# Patient Record
Sex: MALE | Race: BLACK OR AFRICAN AMERICAN | Employment: UNEMPLOYED | URBAN - METROPOLITAN AREA
[De-identification: names, ages, dates, MRNs, and addresses within clinical notes are randomized per-mention and may not be internally consistent; named-entity substitution may affect disease eponyms.]

---

## 2017-01-05 ENCOUNTER — HOSPITAL ENCOUNTER (OUTPATIENT)
Dept: RESPIRATORY THERAPY | Age: 37
Discharge: HOME | End: 2017-01-05
Attending: FAMILY MEDICINE
Payer: MEDICARE

## 2017-01-05 DIAGNOSIS — J45.30 MILD PERSISTENT ASTHMA WITHOUT COMPLICATION: ICD-10-CM

## 2017-01-05 PROCEDURE — 94729 DIFFUSING CAPACITY: CPT

## 2017-01-05 PROCEDURE — 94726 PLETHYSMOGRAPHY LUNG VOLUMES: CPT

## 2017-01-05 PROCEDURE — 94010 BREATHING CAPACITY TEST: CPT

## 2017-01-11 ENCOUNTER — TELEPHONE (OUTPATIENT)
Dept: FAMILY MEDICINE CLINIC | Age: 37
End: 2017-01-11

## 2017-01-25 ENCOUNTER — HOSPITAL ENCOUNTER (OUTPATIENT)
Dept: LAB | Age: 37
Discharge: HOME | End: 2017-01-25
Payer: MEDICARE

## 2017-01-25 ENCOUNTER — TELEPHONE (OUTPATIENT)
Dept: FAMILY MEDICINE CLINIC | Age: 37
End: 2017-01-25

## 2017-01-25 ENCOUNTER — OFFICE VISIT (OUTPATIENT)
Dept: FAMILY MEDICINE CLINIC | Age: 37
End: 2017-01-25

## 2017-01-25 VITALS
HEART RATE: 61 BPM | TEMPERATURE: 98 F | SYSTOLIC BLOOD PRESSURE: 96 MMHG | DIASTOLIC BLOOD PRESSURE: 62 MMHG | HEIGHT: 69 IN | WEIGHT: 288 LBS | BODY MASS INDEX: 42.65 KG/M2 | RESPIRATION RATE: 20 BRPM | OXYGEN SATURATION: 99 %

## 2017-01-25 DIAGNOSIS — J03.91 RECURRENT TONSILLITIS: ICD-10-CM

## 2017-01-25 DIAGNOSIS — Z23 ENCOUNTER FOR IMMUNIZATION: Primary | ICD-10-CM

## 2017-01-25 PROCEDURE — 87070 CULTURE OTHR SPECIMN AEROBIC: CPT | Performed by: NURSE PRACTITIONER

## 2017-01-25 PROCEDURE — 87077 CULTURE AEROBIC IDENTIFY: CPT | Performed by: NURSE PRACTITIONER

## 2017-01-25 RX ORDER — ACETAMINOPHEN AND CODEINE PHOSPHATE 300; 30 MG/1; MG/1
1 TABLET ORAL
Qty: 30 TAB | Refills: 0 | Status: SHIPPED | OUTPATIENT
Start: 2017-01-25 | End: 2017-04-24

## 2017-01-25 NOTE — MR AVS SNAPSHOT
Visit Information Date & Time Provider Department Dept. Phone Encounter #  
 1/25/2017 11:45 AM Shagufta Ugalde NP [employer] [phone] [id number] Upcoming Health Maintenance Date Due Pneumococcal 19-64 Medium Risk (1 of 1 - PPSV23) 10/7/2000 INFLUENZA AGE 9 TO ADULT 8/1/2016 DTaP/Tdap/Td series (2 - Td) 10/28/2026 Allergies as of 1/25/2017  Review Complete On: 1/25/2017 By: Shagufta Ugalde NP No Known Allergies Current Immunizations  Reviewed on 3/20/2012 Name Date Influenza Vaccine (Quad)  Incomplete PPD 3/20/2012 Tdap 10/28/2016  2:37 AM  
  
 Not reviewed this visit You Were Diagnosed With   
  
 Codes Comments Encounter for immunization    -  Primary ICD-10-CM: M62 ICD-9-CM: V03.89 Recurrent tonsillitis     ICD-10-CM: J03.91 
ICD-9-CM: 838 Vitals BP Pulse Temp Resp Height(growth percentile) Weight(growth percentile) 96/62 61 98 °F (36.7 °C) (Oral) 20 5' 9\" (1.753 m) 288 lb (130.6 kg) SpO2 BMI Smoking Status 99% 42.53 kg/m2 Never Smoker Vitals History BMI and BSA Data Body Mass Index Body Surface Area 42.53 kg/m 2 2.52 m 2 Preferred Pharmacy Pharmacy Name Phone [id number], [address] [phone] Your Updated Medication List  
  
   
This list is accurate as of: 1/25/17 12:04 PM.  Always use your most recent med list.  
  
  
  
  
 acetaminophen-codeine 300-30 mg per tablet Commonly known as:  TYLENOL #3 Take 1 Tab by mouth every four (4) hours as needed for Pain. Max Daily Amount: 6 Tabs. albuterol 90 mcg/actuation inhaler Commonly known as:  PROVENTIL HFA, VENTOLIN HFA, PROAIR HFA Take 2 Puffs by inhalation every six (6) hours as needed for Wheezing. Indications: BRONCHOSPASM PREVENTION  
  
 benztropine 0.5 mg tablet Commonly known as:  COGENTIN Take 1 mg by mouth daily. * haloperidol 10 mg tablet Commonly known as:  HALDOL Take 5 mg by mouth two (2) times a day. * haloperidol 10 mg tablet Commonly known as:  HALDOL Take 10 mg by mouth two (2) times a day. predniSONE 5 mg dose pack Commonly known as:  STERAPRED See administration instruction per 5mg dose pack WELLBUTRIN 100 mg tablet Generic drug:  buPROPion Take 150 mg by mouth daily. * Notice: This list has 2 medication(s) that are the same as other medications prescribed for you. Read the directions carefully, and ask your doctor or other care provider to review them with you. Prescriptions Printed Refills  
 acetaminophen-codeine (TYLENOL #3) 300-30 mg per tablet 0 Sig: Take 1 Tab by mouth every four (4) hours as needed for Pain. Max Daily Amount: 6 Tabs. Class: Print Route: Oral  
  
We Performed the Following INFLUENZA VACCINE QUADRIVALENT VIAL, SPLIT, 3 YRS PLUS IM [id number] CPT(R)] REFERRAL TO ENT-OTOLARYNGOLOGY [GYM92 Custom] Comments:  
 Please evaluate patient for enlarged tonsils, recurrent tonsillitis. To-Do List   
 01/25/2017 Microbiology:  THROAT CULTURE Referral Information Referral ID Referred By Referred To  
  
 [id number] [facility] [address] [facility] [address] [place], [address] [place] [address] Road Phone: [phone] Fax: [phone] Visits Status Start Date End Date 1 New Request 1/25/17 1/25/18 If your referral has a status of pending review or denied, additional information will be sent to support the outcome of this decision. Patient Instructions Call ENT to follow up before you move. Take tylenol #3 for cough and/or for pain I will let you know as soon as I find out about the throat culture. Influenza (Flu) Vaccine (Inactivated or Recombinant): What You Need to Know Why get vaccinated? Influenza (\"flu\") is a contagious disease that spreads around the [place] [facility] every winter, usually between October and May. Flu is caused by influenza viruses and is spread mainly by coughing, sneezing, and close contact. Anyone can get flu. Flu strikes suddenly and can last several days. Symptoms vary by age, but can include: · Fever/chills. · Sore throat. · Muscle aches. · Fatigue. · Cough. · Headache. · Runny or stuffy nose. Flu can also lead to pneumonia and blood infections, and cause diarrhea and seizures in children. If you have a medical condition, such as heart or lung disease, flu can make it worse. Flu is more dangerous for some people. Infants and young children, people 72years of age and older, pregnant women, and people with certain health conditions or a weakened immune system are at greatest risk. Each year thousands of people in the [facility] die from flu, and many more are hospitalized. Flu vaccine can: · Keep you from getting flu. · Make flu less severe if you do get it. · Keep you from spreading flu to your family and other people. Inactivated and recombinant flu vaccines A dose of flu vaccine is recommended every flu season. Children 6 months through 6years of age may need two doses during the same flu season. Everyone else needs only one dose each flu season. Some inactivated flu vaccines contain a very small amount of a mercury-based preservative called thimerosal. Studies have not shown thimerosal in vaccines to be harmful, but flu vaccines that do not contain thimerosal are available. There is no live flu virus in flu shots. They cannot cause the flu. There are many flu viruses, and they are always changing. Each year a new flu vaccine is made to protect against three or four viruses that are likely to cause disease in the upcoming flu season. But even when the vaccine doesn't exactly match these viruses, it may still provide some protection. Flu vaccine cannot prevent: · Flu that is caused by a virus not covered by the vaccine. · Illnesses that look like flu but are not. Some people should not get this vaccine Tell the person who is giving you the vaccine: · If you have any severe (life-threatening) allergies. If you ever had a life-threatening allergic reaction after a dose of flu vaccine, or have a severe allergy to any part of this vaccine, you may be advised not to get vaccinated. Most, but not all, types of flu vaccine contain a small amount of egg protein. · If you ever had Guillain-Barré syndrome (also called GBS) Some people with a history of GBS should not get this vaccine. This should be discussed with your doctor. · If you are not feeling well. It is usually okay to get flu vaccine when you have a mild illness, but you might be asked to come back when you feel better. Risks of a vaccine reaction With any medicine, including vaccines, there is a chance of reactions. These are usually mild and go away on their own, but serious reactions are also possible. Most people who get a flu shot do not have any problems with it. Minor problems following a flu shot include: · Soreness, redness, or swelling where the shot was given · Hoarseness · Sore, red or itchy eyes · Cough · Fever · Aches · Headache · Itching · Fatigue If these problems occur, they usually begin soon after the shot and last 1 or 2 days. More serious problems following a flu shot can include the following: · There may be a small increased risk of Guillain-Barré Syndrome (GBS) after inactivated flu vaccine. This risk has been estimated at 1 or 2 additional cases per million people vaccinated. This is much lower than the risk of severe complications from flu, which can be prevented by flu vaccine.  
· The Minka children who get the flu shot along with pneumococcal vaccine (PCV13) and/or DTaP vaccine at the same time might be slightly more likely to have a seizure caused by fever. Ask your doctor for more information. Tell your doctor if a child who is getting flu vaccine has ever had a seizure Problems that could happen after any injected vaccine: · People sometimes faint after a medical procedure, including vaccination. Sitting or lying down for about 15 minutes can help prevent fainting, and injuries caused by a fall. Tell your doctor if you feel dizzy, or have vision changes or ringing in the ears. · Some people get severe pain in the shoulder and have difficulty moving the arm where a shot was given. This happens very rarely. · Any medication can cause a severe allergic reaction. Such reactions from a vaccine are very rare, estimated at about 1 in a million doses, and would happen within a few minutes to a few hours after the vaccination. As with any medicine, there is a very remote chance of a vaccine causing a serious injury or death. The safety of vaccines is always being monitored. For more information, visit: www.cdc.gov/vaccinesafety/. What if there is a serious reaction? What should I look for? · Look for anything that concerns you, such as signs of a severe allergic reaction, very high fever, or unusual behavior. Signs of a severe allergic reaction can include hives, swelling of the face and throat, difficulty breathing, a fast heartbeat, dizziness, and weakness  usually within a few minutes to a few hours after the vaccination. What should I do? · If you think it is a severe allergic reaction or other emergency that can't wait, call 9-1-1 and get the person to the nearest hospital. Otherwise, call your doctor. · Reactions should be reported to the \"Vaccine Adverse Event Reporting System\" (VAERS). Your doctor should file this report, or you can do it yourself through the VAERS website at www.vaers. [employer].gov, or by calling 9-395.498.6159. VAERS does not give medical advice.  
The [employer] Vaccine Injury W. YULIANA. Maxine 
 The [employer] Injury Compensation Program (VICP) is a federal program that was created to compensate people who may have been injured by certain vaccines. Persons who believe they may have been injured by a vaccine can learn about the program and about filing a claim by calling 1-272.452.2772 or visiting the [employer] Out[employer] Referrals website at www.[facility].gov/vaccinecompensation. There is a time limit to file a claim for compensation. How can I learn more? · Ask your healthcare provider. He or she can give you the vaccine package insert or suggest other sources of information. · Call your local or state health department. · Contact the Centers for Disease Control and Prevention (CDC): 
¨ Call 2-921.675.3995 (1-800-CDC-INFO) or ¨ Visit CDC's website at www.cdc.gov/flu Vaccine Information Statement Inactivated Influenza Vaccine 8/7/2015) 42 DAMI Melissa 705MS-40 Department of [facility] and "[employer] Centers for Disease Control and Prevention Many Vaccine Information Statements are available in [place] and other languages. See www.immunize.org/vis. Muchas hojas de información sobre vacunas están disponibles en español y en otros idiomas. Visite www.immunize.org/vis. Care instructions adapted under license by your healthcare professional. If you have questions about a medical condition or this instruction, always ask your healthcare professional. [address] any warranty or liability for your use of this information. Please provide this summary of care documentation to your next provider. Your primary care clinician is listed as Jose Angel Anne. If you have any questions after today's visit, please call [phone].

## 2017-01-25 NOTE — TELEPHONE ENCOUNTER
Takes a minimum of 2 days to grow, results will not be available until Friday at the earliest, possibly Monday.

## 2017-01-25 NOTE — PROGRESS NOTES
Chief Complaint   Patient presents with    Cold Symptoms     Pt states at night he starts coughing he can not breathe for about 3 or 4 mins. Cols symptom streeter a week. Pt states he had a lung test done order by . 1. Have you been to the ER, urgent care clinic since your last visit? Hospitalized since your last visit? No    2. Have you seen or consulted any other health care providers outside of the [address] [place] [address] since your last visit? Include any pap smears or colon screening.  No

## 2017-01-25 NOTE — PATIENT INSTRUCTIONS
Call ENT to follow up before you move. Take tylenol #3 for cough and/or for pain  I will let you know as soon as I find out about the throat culture. Influenza (Flu) Vaccine (Inactivated or Recombinant): What You Need to Know  Why get vaccinated? Influenza (\"flu\") is a contagious disease that spreads around the [place] [facility] every winter, usually between October and May. Flu is caused by influenza viruses and is spread mainly by coughing, sneezing, and close contact. Anyone can get flu. Flu strikes suddenly and can last several days. Symptoms vary by age, but can include:  · Fever/chills. · Sore throat. · Muscle aches. · Fatigue. · Cough. · Headache. · Runny or stuffy nose. Flu can also lead to pneumonia and blood infections, and cause diarrhea and seizures in children. If you have a medical condition, such as heart or lung disease, flu can make it worse. Flu is more dangerous for some people. Infants and young children, people 72years of age and older, pregnant women, and people with certain health conditions or a weakened immune system are at greatest risk. Each year thousands of people in the [facility] die from flu, and many more are hospitalized. Flu vaccine can:  · Keep you from getting flu. · Make flu less severe if you do get it. · Keep you from spreading flu to your family and other people. Inactivated and recombinant flu vaccines  A dose of flu vaccine is recommended every flu season. Children 6 months through 6years of age may need two doses during the same flu season. Everyone else needs only one dose each flu season. Some inactivated flu vaccines contain a very small amount of a mercury-based preservative called thimerosal. Studies have not shown thimerosal in vaccines to be harmful, but flu vaccines that do not contain thimerosal are available. There is no live flu virus in flu shots. They cannot cause the flu. There are many flu viruses, and they are always changing.  Each year a new flu vaccine is made to protect against three or four viruses that are likely to cause disease in the upcoming flu season. But even when the vaccine doesn't exactly match these viruses, it may still provide some protection. Flu vaccine cannot prevent:  · Flu that is caused by a virus not covered by the vaccine. · Illnesses that look like flu but are not. Some people should not get this vaccine  Tell the person who is giving you the vaccine:  · If you have any severe (life-threatening) allergies. If you ever had a life-threatening allergic reaction after a dose of flu vaccine, or have a severe allergy to any part of this vaccine, you may be advised not to get vaccinated. Most, but not all, types of flu vaccine contain a small amount of egg protein. · If you ever had Guillain-Barré syndrome (also called GBS) Some people with a history of GBS should not get this vaccine. This should be discussed with your doctor. · If you are not feeling well. It is usually okay to get flu vaccine when you have a mild illness, but you might be asked to come back when you feel better. Risks of a vaccine reaction  With any medicine, including vaccines, there is a chance of reactions. These are usually mild and go away on their own, but serious reactions are also possible. Most people who get a flu shot do not have any problems with it. Minor problems following a flu shot include:  · Soreness, redness, or swelling where the shot was given  · Hoarseness  · Sore, red or itchy eyes  · Cough  · Fever  · Aches  · Headache  · Itching  · Fatigue  If these problems occur, they usually begin soon after the shot and last 1 or 2 days. More serious problems following a flu shot can include the following:  · There may be a small increased risk of Guillain-Barré Syndrome (GBS) after inactivated flu vaccine. This risk has been estimated at 1 or 2 additional cases per million people vaccinated.  This is much lower than the risk of severe complications from flu, which can be prevented by flu vaccine. · Jackelyn Barkley children who get the flu shot along with pneumococcal vaccine (PCV13) and/or DTaP vaccine at the same time might be slightly more likely to have a seizure caused by fever. Ask your doctor for more information. Tell your doctor if a child who is getting flu vaccine has ever had a seizure  Problems that could happen after any injected vaccine:  · People sometimes faint after a medical procedure, including vaccination. Sitting or lying down for about 15 minutes can help prevent fainting, and injuries caused by a fall. Tell your doctor if you feel dizzy, or have vision changes or ringing in the ears. · Some people get severe pain in the shoulder and have difficulty moving the arm where a shot was given. This happens very rarely. · Any medication can cause a severe allergic reaction. Such reactions from a vaccine are very rare, estimated at about 1 in a million doses, and would happen within a few minutes to a few hours after the vaccination. As with any medicine, there is a very remote chance of a vaccine causing a serious injury or death. The safety of vaccines is always being monitored. For more information, visit: www.cdc.gov/vaccinesafety/. What if there is a serious reaction? What should I look for? · Look for anything that concerns you, such as signs of a severe allergic reaction, very high fever, or unusual behavior. Signs of a severe allergic reaction can include hives, swelling of the face and throat, difficulty breathing, a fast heartbeat, dizziness, and weakness  usually within a few minutes to a few hours after the vaccination. What should I do? · If you think it is a severe allergic reaction or other emergency that can't wait, call 9-1-1 and get the person to the nearest hospital. Otherwise, call your doctor. · Reactions should be reported to the \"Vaccine Adverse Event Reporting System\" (VAERS).  Your doctor should file this report, or you can do it yourself through the VAERS website at www.vaers. [facility].gov, or by calling 5-393.611.6417. VAERS does not give medical advice. The National Vaccine Injury Compensation Program  The National Vaccine Injury Compensation Program (VICP) is a federal program that was created to compensate people who may have been injured by certain vaccines. Persons who believe they may have been injured by a vaccine can learn about the program and about filing a claim by calling 6-336.311.7470 or visiting the [employer] website at www.[facility].gov/vaccinecompensation. There is a time limit to file a claim for compensation. How can I learn more? · Ask your healthcare provider. He or she can give you the vaccine package insert or suggest other sources of information. · Call your local or state health department. · Contact the Centers for Disease Control and Prevention (CDC):  ¨ Call 4-208.936.3003 (1-800-CDC-INFO) or  ¨ Visit CDC's website at www.cdc.gov/flu  Vaccine Information Statement  Inactivated Influenza Vaccine  8/7/2015)  42 [employer] 816NC-99  Department of Health and Human Services  Centers for Disease Control and Prevention  Many Vaccine Information Statements are available in Yoruba and other languages. See www.immunize.org/vis. Muchas hojas de información sobre vacunas están disponibles en español y en otros idiomas. Visite www.immunize.org/vis. Care instructions adapted under license by your healthcare professional. If you have questions about a medical condition or this instruction, always ask your healthcare professional. [address] any warranty or liability for your use of this information.

## 2017-01-25 NOTE — LETTER
1/31/2017 12:29 PM 
 
Mr. Karlos Jones [address] [facility] Dmitriy Eddi [address] [place] [id number] Dear Karlos Jones: Please find your most recent results below. Resulted Orders THROAT CULTURE Result Value Ref Range Special Requests: NO SPECIAL REQUESTS Culture result: MODERATE 
STREPTOCOCCI, BETA HEMOLYTIC GROUP C Culture result: NO BETA HEMOLYTIC STREPTOCOCCUS GROUP A ISOLATED Culture result: MANY 
NORMAL RESPIRATORY PRACHI Please call me if you have any questions: [phone] Sincerely, [facility] LAB OUTREACH INSURANCE

## 2017-01-26 NOTE — PROGRESS NOTES
Chief Complaint   Patient presents with    Cold Symptoms     he is a 29y.o. year old male who presents for evaluation of sore throat, cough. Pt with hx of enlarged tonsils, with 3-4 days of cough, sore throat. States cough is spasmatic and gets where he feels like he cannot stop coughing. Pt does have sleep apnea on cpap. Reviewed and agree with Nurse Note duplicated in this note. Reviewed PmHx, RxHx, FmHx, SocHx, AllgHx and updated in chart. Patient Labs were reviewed: yes    Patient Past Records were reviewed:  yes    Review of Systems - negative except as listed above    Objective:     Vitals:    01/25/17 1136   BP: 96/62   Pulse: 61   Resp: 20   Temp: 98 °F (36.7 °C)   TempSrc: Oral   SpO2: 99%   Weight: 288 lb (130.6 kg)   Height: 5' 9\" (1.753 m)     Physical Examination: General appearance - alert, well appearing, and in no distress  Mental status - alert, oriented to person, place, and time  Eyes - pupils equal and reactive, extraocular eye movements intact  Ears - bilateral TM's and external ear canals normal  Mouth - tonsils hypertrophied with exudate, tongue normal, throat culture obtained and TMJ exam normal, no tenderness, normal excursion  Neck - bilateral symmetric anterior adenopathy  Lymphatics - no palpable lymphadenopathy, no hepatosplenomegaly  Chest - clear to auscultation, no wheezes, rales or rhonchi, symmetric air entry  Heart - normal rate, regular rhythm, normal S1, S2, no murmurs, rubs, clicks or gallops  Musculoskeletal - no joint tenderness, deformity or swelling    Assessment/ Plan:   Escobar Ho was seen today for cold symptoms.     Diagnoses and all orders for this visit:    Encounter for immunization  -     Cancel: Influenza vaccine (QUADRIVALENT VIAL) 3 years and older IM  -     Influenza virus vaccine (QUADRIVALENT PRES FREE SYRINGE) IM 3 years and older    Recurrent tonsillitis  -     THROAT CULTURE; Future  -     REFERRAL TO ENT-OTOLARYNGOLOGY  -     acetaminophen-codeine (TYLENOL #3) 300-30 mg per tablet; Take 1 Tab by mouth every four (4) hours as needed for Pain. Max Daily Amount: 6 Tabs. Encounter Diagnoses   Name Primary?  Encounter for immunization Yes    Recurrent tonsillitis      Orders Placed This Encounter    THROAT CULTURE    REFERRAL TO ENT-OTOLARYNGOLOGY    acetaminophen-codeine (TYLENOL #3) 300-30 mg per tablet     Follow-up Disposition: Not on File  Patient Instructions   Call ENT to follow up before you move. Take tylenol #3 for cough and/or for pain  I will let you know as soon as I find out about the throat culture. Influenza (Flu) Vaccine (Inactivated or Recombinant): What You Need to Know  Why get vaccinated? Influenza (\"flu\") is a contagious disease that spreads around the [place] every winter, usually between October and May. Flu is caused by influenza viruses and is spread mainly by coughing, sneezing, and close contact. Anyone can get flu. Flu strikes suddenly and can last several days. Symptoms vary by age, but can include:  · Fever/chills. · Sore throat. · Muscle aches. · Fatigue. · Cough. · Headache. · Runny or stuffy nose. Flu can also lead to pneumonia and blood infections, and cause diarrhea and seizures in children. If you have a medical condition, such as heart or lung disease, flu can make it worse. Flu is more dangerous for some people. Infants and young children, people 72years of age and older, pregnant women, and people with certain health conditions or a weakened immune system are at greatest risk. Each year thousands of people in the [facility] die from flu, and many more are hospitalized. Flu vaccine can:  · Keep you from getting flu. · Make flu less severe if you do get it. · Keep you from spreading flu to your family and other people. Inactivated and recombinant flu vaccines  A dose of flu vaccine is recommended every flu season.  Children 6 months through 6years of age may need two doses during the same flu season. Everyone else needs only one dose each flu season. Some inactivated flu vaccines contain a very small amount of a mercury-based preservative called thimerosal. Studies have not shown thimerosal in vaccines to be harmful, but flu vaccines that do not contain thimerosal are available. There is no live flu virus in flu shots. They cannot cause the flu. There are many flu viruses, and they are always changing. Each year a new flu vaccine is made to protect against three or four viruses that are likely to cause disease in the upcoming flu season. But even when the vaccine doesn't exactly match these viruses, it may still provide some protection. Flu vaccine cannot prevent:  · Flu that is caused by a virus not covered by the vaccine. · Illnesses that look like flu but are not. Some people should not get this vaccine  Tell the person who is giving you the vaccine:  · If you have any severe (life-threatening) allergies. If you ever had a life-threatening allergic reaction after a dose of flu vaccine, or have a severe allergy to any part of this vaccine, you may be advised not to get vaccinated. Most, but not all, types of flu vaccine contain a small amount of egg protein. · If you ever had Guillain-Barré syndrome (also called GBS) Some people with a history of GBS should not get this vaccine. This should be discussed with your doctor. · If you are not feeling well. It is usually okay to get flu vaccine when you have a mild illness, but you might be asked to come back when you feel better. Risks of a vaccine reaction  With any medicine, including vaccines, there is a chance of reactions. These are usually mild and go away on their own, but serious reactions are also possible. Most people who get a flu shot do not have any problems with it.   Minor problems following a flu shot include:  · Soreness, redness, or swelling where the shot was given  · Hoarseness  · Sore, red or itchy eyes  · Cough  · Fever  · Aches  · Headache  · Itching  · Fatigue  If these problems occur, they usually begin soon after the shot and last 1 or 2 days. More serious problems following a flu shot can include the following:  · There may be a small increased risk of Guillain-Barré Syndrome (GBS) after inactivated flu vaccine. This risk has been estimated at 1 or 2 additional cases per million people vaccinated. This is much lower than the risk of severe complications from flu, which can be prevented by flu vaccine. · The [employer] children who get the flu shot along with pneumococcal vaccine (PCV13) and/or DTaP vaccine at the same time might be slightly more likely to have a seizure caused by fever. Ask your doctor for more information. Tell your doctor if a child who is getting flu vaccine has ever had a seizure  Problems that could happen after any injected vaccine:  · People sometimes faint after a medical procedure, including vaccination. Sitting or lying down for about 15 minutes can help prevent fainting, and injuries caused by a fall. Tell your doctor if you feel dizzy, or have vision changes or ringing in the ears. · Some people get severe pain in the shoulder and have difficulty moving the arm where a shot was given. This happens very rarely. · Any medication can cause a severe allergic reaction. Such reactions from a vaccine are very rare, estimated at about 1 in a million doses, and would happen within a few minutes to a few hours after the vaccination. As with any medicine, there is a very remote chance of a vaccine causing a serious injury or death. The safety of vaccines is always being monitored. For more information, visit: www.cdc.gov/vaccinesafety/. What if there is a serious reaction? What should I look for? · Look for anything that concerns you, such as signs of a severe allergic reaction, very high fever, or unusual behavior.   Signs of a severe allergic reaction can include hives, swelling of the face and throat, difficulty breathing, a fast heartbeat, dizziness, and weakness  usually within a few minutes to a few hours after the vaccination. What should I do? · If you think it is a severe allergic reaction or other emergency that can't wait, call 9-1-1 and get the person to the nearest hospital. Otherwise, call your doctor. · Reactions should be reported to the \"Vaccine Adverse Event Reporting System\" (VAERS). Your doctor should file this report, or you can do it yourself through the VAERS website at www.vaers. [facility].gov, or by calling 0-171.896.3589. VAERS does not give medical advice. The National Vaccine Injury Compensation Program  The National Vaccine Injury Compensation Program (VICP) is a federal program that was created to compensate people who may have been injured by certain vaccines. Persons who believe they may have been injured by a vaccine can learn about the program and about filing a claim by calling 6-215.541.2284 or visiting the [employer] website at www.[facility].gov/vaccinecompensation. There is a time limit to file a claim for compensation. How can I learn more? · Ask your healthcare provider. He or she can give you the vaccine package insert or suggest other sources of information. · Call your local or state health department. · Contact the Centers for Disease Control and Prevention (CDC):  ¨ Call 3-531.830.7330 (1-800-CDC-INFO) or  ¨ Visit CDC's website at www.cdc.gov/flu  Vaccine Information Statement  Inactivated Influenza Vaccine  8/7/2015)  [address] [place] 533[place]-58  Department of Health and Human Services  Centers for Disease Control and Prevention  Many Vaccine Information Statements are available in Urdu and other languages. See www.immunize.org/vis. Muchas hojas de información sobre vacunas están disponibles en español y en otros idiomas. Visite www.immunize.org/vis.   Care instructions adapted under license by your healthcare professional. If you have questions about a medical condition or this instruction, always ask your healthcare professional. [place] [address] any warranty or liability for your use of this information. I have discussed the diagnosis with the patient and the intended plan as seen in the above orders. The patient has received an After-Visit Summary and questions were answered concerning future plans.      Medication Side Effects and Warnings were discussed with patient: yes      Saray Sherman NP-C  [employer]

## 2017-01-28 LAB
BACTERIA SPEC CULT: NORMAL
SERVICE CMNT-IMP: NORMAL

## 2017-01-30 ENCOUNTER — TELEPHONE (OUTPATIENT)
Dept: FAMILY MEDICINE CLINIC | Age: 37
End: 2017-01-30

## 2017-01-30 DIAGNOSIS — J02.0 STREP THROAT: Primary | ICD-10-CM

## 2017-01-30 RX ORDER — AMOXICILLIN 500 MG/1
500 CAPSULE ORAL 2 TIMES DAILY
Qty: 20 CAP | Refills: 0 | Status: SHIPPED | OUTPATIENT
Start: 2017-01-30 | End: 2017-02-09

## 2017-01-30 NOTE — PROGRESS NOTES
Pt culture was positive for strep, he should be treated with amoxicillin 500mg twice a day for 10 days, I have sent it to the pharmacy on record.

## 2017-01-30 NOTE — TELEPHONE ENCOUNTER
Called both number however was unable to get intouch with pt. Receive phone message stating the person you have call is unable right now recording.

## 2017-01-31 ENCOUNTER — TELEPHONE (OUTPATIENT)
Dept: FAMILY MEDICINE CLINIC | Age: 37
End: 2017-01-31

## 2017-01-31 NOTE — TELEPHONE ENCOUNTER
Unable to reach pt at both number you only get a recording. Letter will be sent to patient in regards to his results from [address] [place] [address].

## 2017-04-24 ENCOUNTER — HOSPITAL ENCOUNTER (INPATIENT)
Age: 37
LOS: 6 days | Discharge: HOME | DRG: 885 | End: 2017-05-01
Attending: EMERGENCY MEDICINE | Admitting: PSYCHIATRY & NEUROLOGY
Payer: MEDICARE

## 2017-04-24 DIAGNOSIS — R45.851 SUICIDAL IDEATION: Primary | ICD-10-CM

## 2017-04-24 DIAGNOSIS — R44.0 AUDITORY HALLUCINATION: ICD-10-CM

## 2017-04-24 LAB
AMPHET UR QL SCN: NEGATIVE
ANION GAP BLD CALC-SCNC: 6 MMOL/L (ref 3–18)
BARBITURATES UR QL SCN: NEGATIVE
BASOPHILS # BLD AUTO: 0 K/UL (ref 0–0.1)
BASOPHILS # BLD: 0 % (ref 0–2)
BENZODIAZ UR QL: NEGATIVE
BUN SERPL-MCNC: 13 MG/DL (ref 7–18)
BUN/CREAT SERPL: 9 (ref 12–20)
CALCIUM SERPL-MCNC: 9.7 MG/DL (ref 8.5–10.1)
CANNABINOIDS UR QL SCN: NEGATIVE
CHLORIDE SERPL-SCNC: 100 MMOL/L (ref 100–108)
CO2 SERPL-SCNC: 28 MMOL/L (ref 21–32)
COCAINE UR QL SCN: NEGATIVE
CREAT SERPL-MCNC: 1.51 MG/DL (ref 0.6–1.3)
DIFFERENTIAL METHOD BLD: ABNORMAL
EOSINOPHIL # BLD: 0 K/UL (ref 0–0.4)
EOSINOPHIL NFR BLD: 0 % (ref 0–5)
ERYTHROCYTE [DISTWIDTH] IN BLOOD BY AUTOMATED COUNT: 13.8 % (ref 11.6–14.5)
ETHANOL SERPL-MCNC: <3 MG/DL (ref 0–3)
GLUCOSE SERPL-MCNC: 150 MG/DL (ref 74–99)
HCT VFR BLD AUTO: 48.6 % (ref 36–48)
HDSCOM,HDSCOM: NORMAL
HGB BLD-MCNC: 16.9 G/DL (ref 13–16)
LYMPHOCYTES # BLD AUTO: 13 % (ref 21–52)
LYMPHOCYTES # BLD: 2.1 K/UL (ref 0.9–3.6)
MCH RBC QN AUTO: 29.5 PG (ref 24–34)
MCHC RBC AUTO-ENTMCNC: 34.8 G/DL (ref 31–37)
MCV RBC AUTO: 85 FL (ref 74–97)
METHADONE UR QL: NEGATIVE
MONOCYTES # BLD: 0.1 K/UL (ref 0.05–1.2)
MONOCYTES NFR BLD AUTO: 1 % (ref 3–10)
NEUTS SEG # BLD: 14.2 K/UL (ref 1.8–8)
NEUTS SEG NFR BLD AUTO: 86 % (ref 40–73)
OPIATES UR QL: NEGATIVE
PCP UR QL: NEGATIVE
PLATELET # BLD AUTO: 219 K/UL (ref 135–420)
PMV BLD AUTO: 12 FL (ref 9.2–11.8)
POTASSIUM SERPL-SCNC: 4.1 MMOL/L (ref 3.5–5.5)
RBC # BLD AUTO: 5.72 M/UL (ref 4.7–5.5)
SODIUM SERPL-SCNC: 134 MMOL/L (ref 136–145)
WBC # BLD AUTO: 16.3 K/UL (ref 4.6–13.2)

## 2017-04-24 PROCEDURE — 80307 DRUG TEST PRSMV CHEM ANLYZR: CPT | Performed by: EMERGENCY MEDICINE

## 2017-04-24 PROCEDURE — 85025 COMPLETE CBC W/AUTO DIFF WBC: CPT | Performed by: EMERGENCY MEDICINE

## 2017-04-24 PROCEDURE — 80048 BASIC METABOLIC PNL TOTAL CA: CPT | Performed by: EMERGENCY MEDICINE

## 2017-04-24 PROCEDURE — 99284 EMERGENCY DEPT VISIT MOD MDM: CPT
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
## 2017-04-24 NOTE — ED NOTES
Patient with SI/HI. C/o hallucinations/delusions. States he was restarted on his medication for schizophrenia two days ago, which is when his delusions started. Patient states that the voices are telling him to harm himself as well as others. Patient provided with disposable scrubs and red socks. SAD score 6, Q15M safety checks initiated. Patient contracts for safety at this time.

## 2017-04-24 NOTE — IP AVS SNAPSHOT
[address] [facility] 
 
 
 [address] Patient: Rakesh Mathis MRN: [id number] :1980 You are allergic to the following No active allergies Immunizations Administered for This Admission Name Date Influenza Vaccine (Quad) PF  Deferred () Recent Documentation Height Weight BMI Smoking Status 1.753 m 122.5 kg 39.87 kg/m2 Never Smoker Emergency Contacts Name Discharge Info Relation Home Work Mobile Titi Villarreal  Other Relative [6] [phone] About your hospitalization You were admitted on:  2017 You last received care in the:  [facility] You were discharged on:  May 1, 2017 Unit phone number:  [phone] Why you were hospitalized Your primary diagnosis was:  Not on File Your diagnoses also included:  Schizoaffective Disorder (Hcc) Providers Seen During Your Hospitalizations Provider Role Specialty Primary office phone Gideon Cruz DO Attending Provider Emergency Medicine [phone] Elroy Napier MD Attending Provider Emergency Medicine [phone] Allyn Ferreira MD Attending Provider Psychiatry [phone] Your Primary Care Physician (PCP) Primary Care Physician Office Phone Office Fax Evaristo Zamarripa [phone] Follow-up Information Follow up With Details Comments Contact Info Mynor Fleming NP   [address] [facility] [address] [address] [phone] [facility] in [address]   Numbers to remember Jennie Gut front [address] [facility] Suicide Prevention [phone](talk) [address] [address]. phone [address] Pt to make his own appointment Current Discharge Medication List  
  
 START taking these medications Dose & Instructions Dispensing Information Comments Morning Noon Evening Bedtime buPROPion  mg SR tablet Commonly known as:  WELLBUTRIN SR  
Replaces:  WELLBUTRIN 100 mg tablet Your last dose was: Your next dose is:    
   
   
 Dose:  150 mg Take 1 Tab by mouth daily for 30 days. Indications: ANXIETY WITH DEPRESSION, major depressive disorder Quantity:  30 Tab Refills:  0  
     
   
   
   
  
 neomycin-bacitracin-polymyxin 3.5mg-400 unit- 5,000 unit/gram ointment Commonly known as:  NEOSPORIN Your last dose was: Your next dose is:    
   
   
 Apply  to affected area two (2) times a day for 30 days. Indications: MINOR BACTERIAL SKIN INFECTIONS Quantity:  1 Tube Refills:  0  
     
   
   
   
  
 zolpidem 5 mg tablet Commonly known as:  AMBIEN Your last dose was: Your next dose is:    
   
   
 Dose:  5 mg Take 1 Tab by mouth nightly as needed for Sleep for up to 30 days. Max Daily Amount: 5 mg. Indications: SLEEP-ONSET INSOMNIA Quantity:  30 Tab Refills:  0 CONTINUE these medications which have CHANGED Dose & Instructions Dispensing Information Comments Morning Noon Evening Bedtime  
 benztropine 1 mg tablet Commonly known as:  COGENTIN What changed:   
- medication strength - when to take this Your last dose was: Your next dose is:    
   
   
 Dose:  1 mg Take 1 Tab by mouth nightly for 30 days. Indications: extrapyramidal disease Quantity:  30 Tab Refills:  0  
     
   
   
   
  
 * haloperidol 10 mg tablet Commonly known as:  HALDOL What changed:  Another medication with the same name was added. Make sure you understand how and when to take each. Your last dose was: Your next dose is:    
   
   
 Dose:  5 mg Take 5 mg by mouth two (2) times a day. Refills:  0 * haloperidol 5 mg tablet Commonly known as:  HALDOL What changed: You were already taking a medication with the same name, and this prescription was added. Make sure you understand how and when to take each. Your last dose was: Your next dose is:    
   
   
 Dose:  5 mg Take 1 Tab by mouth two (2) times a day for 30 days. Indications: SCHIZOPHRENIA Quantity:  60 Tab Refills:  0  
     
   
   
   
  
 * methocarbamol 750 mg tablet Commonly known as:  ROBAXIN What changed:  Another medication with the same name was added. Make sure you understand how and when to take each. Your last dose was: Your next dose is:    
   
   
 Dose:  750 mg Take 1 Tab by mouth four (4) times daily. Quantity:  12 Tab Refills:  0  
     
   
   
   
  
 * methocarbamol 750 mg tablet Commonly known as:  ROBAXIN What changed: You were already taking a medication with the same name, and this prescription was added. Make sure you understand how and when to take each. Your last dose was: Your next dose is:    
   
   
 Dose:  750 mg Take 1 Tab by mouth four (4) times daily for 30 days. Indications: MUSCLE SPASM Quantity:  120 Tab Refills:  0  
     
   
   
   
  
 * Notice: This list has 4 medication(s) that are the same as other medications prescribed for you. Read the directions carefully, and ask your doctor or other care provider to review them with you. CONTINUE these medications which have NOT CHANGED Dose & Instructions Dispensing Information Comments Morning Noon Evening Bedtime  
 albuterol 90 mcg/actuation inhaler Commonly known as:  PROVENTIL HFA, VENTOLIN HFA, PROAIR HFA Your last dose was: Your next dose is:    
   
   
 Dose:  2 Puff Take 2 Puffs by inhalation every six (6) hours as needed for Wheezing. Indications: BRONCHOSPASM PREVENTION Quantity:  1 Inhaler Refills:  3 STOP taking these medications WELLBUTRIN 100 mg tablet Generic drug:  buPROPion Replaced by:  buPROPion  mg SR tablet Where to Get Your Medications [facility] [address] Phone:  [phone]  
  buPROPion  mg SR tablet Information on where to get these meds will be given to you by the nurse or doctor. ! Ask your nurse or doctor about these medications  
  benztropine 1 mg tablet  
 haloperidol 5 mg tablet  
 methocarbamol 750 mg tablet  
 neomycin-bacitracin-polymyxin 3.5mg-400 unit- 5,000 unit/gram ointment  
 zolpidem 5 mg tablet Discharge Instructions BEHAVIORAL HEALTH NURSING DISCHARGE NOTE The following personal items collected during your admission are returned to you:  
Dental Appliance: Dental Appliances: None Vision: Visual Aid: None Hearing Aid:   
Jewelry: Jewelry: None Clothing: Clothing: Belt, Footwear, Shirt, Socks, Undergarments, Pants Other Valuables: Other Valuables: Cell Phone, [employer] 0693 Valuables sent to safe:   
 
 
PATIENT INSTRUCTIONS: 
 
Pt is moving back to [facility]. He is traveling by bus today and his ticket secured Follow-up with [address] [facility] [address] phone [phone] The discharge information has been reviewed with the patient. The patient verbalized understanding. [employer] Activation Thank you for requesting access to [employer]. Please follow the instructions below to securely access and download your online medical record. [employer] allows you to send messages to your doctor, view your test results, renew your prescriptions, schedule appointments, and more. How Do I Sign Up? 1. In your internet browser, go to [email or web address] 
2. Click on the First Time User? Click Here link in the Sign In box.  You will be redirect to the New Member Sign Up page. 3. Enter your [employer] Exchanget Access Code exactly as it appears below. You will not need to use this code after youve completed the sign-up process. If you do not sign up before the expiration date, you must request a new code. MyChart Access Code: Activation code not generated Current UpCloo Status: Patient Declined (This is the date your iConnectivityhart access code will ) 4. Enter the last four digits of your Social Security Number (xxxx) and Date of Birth (mm/dd/yyyy) as indicated and click Submit. You will be taken to the next sign-up page. 5. Create a [employer] Exchanget ID. This will be your UpCloo login ID and cannot be changed, so think of one that is secure and easy to remember. 6. Create a UpCloo password. You can change your password at any time. 7. Enter your Password Reset Question and Answer. This can be used at a later time if you forget your password. 8. Enter your e-mail address. You will receive e-mail notification when new information is available in [address] 9. Click Sign Up. You can now view and download portions of your medical record. 10. Click the Download Summary menu link to download a portable copy of your medical information. Additional Information If you have questions, please visit the Frequently Asked Questions section of the UpCloo website at [email or web address] Remember, UpCloo is NOT to be used for urgent needs. For medical emergencies, dial 911. Patient armband removed and shredded Discharge Orders None UpCloo Announcement We are excited to announce that we are making your provider's discharge notes available to you in UpCloo. You will see these notes when they are completed and signed by the physician that discharged you from your recent hospital stay.   If you have any questions or concerns about any information you see in UpCloo, please call the [facility] Information Department where you were seen or reach out to your Primary Care Provider for more information about your plan of care. General Information Please provide this summary of care documentation to your next provider. Patient Signature:  ____________________________________________________________ Date:  ____________________________________________________________  
  
[facility] Provider Signature:  ____________________________________________________________ Date:  ____________________________________________________________

## 2017-04-24 NOTE — IP AVS SNAPSHOT
Current Discharge Medication List  
  
START taking these medications Dose & Instructions Dispensing Information Comments Morning Noon Evening Bedtime buPROPion  mg SR tablet Commonly known as:  WELLBUTRIN SR  
Replaces:  WELLBUTRIN 100 mg tablet Your last dose was: Your next dose is:    
   
   
 Dose:  150 mg Take 1 Tab by mouth daily for 30 days. Indications: ANXIETY WITH DEPRESSION, major depressive disorder Quantity:  30 Tab Refills:  0  
     
   
   
   
  
 neomycin-bacitracin-polymyxin 3.5mg-400 unit- 5,000 unit/gram ointment Commonly known as:  NEOSPORIN Your last dose was: Your next dose is:    
   
   
 Apply  to affected area two (2) times a day for 30 days. Indications: MINOR BACTERIAL SKIN INFECTIONS Quantity:  1 Tube Refills:  0  
     
   
   
   
  
 zolpidem 5 mg tablet Commonly known as:  AMBIEN Your last dose was: Your next dose is:    
   
   
 Dose:  5 mg Take 1 Tab by mouth nightly as needed for Sleep for up to 30 days. Max Daily Amount: 5 mg. Indications: SLEEP-ONSET INSOMNIA Quantity:  30 Tab Refills:  0 CONTINUE these medications which have CHANGED Dose & Instructions Dispensing Information Comments Morning Noon Evening Bedtime  
 benztropine 1 mg tablet Commonly known as:  COGENTIN What changed:   
- medication strength - when to take this Your last dose was: Your next dose is:    
   
   
 Dose:  1 mg Take 1 Tab by mouth nightly for 30 days. Indications: extrapyramidal disease Quantity:  30 Tab Refills:  0  
     
   
   
   
  
 * haloperidol 10 mg tablet Commonly known as:  HALDOL What changed:  Another medication with the same name was added. Make sure you understand how and when to take each. Your last dose was: Your next dose is:    
   
   
 Dose:  5 mg Take 5 mg by mouth two (2) times a day. Refills:  0  
     
   
   
   
  
 * haloperidol 5 mg tablet Commonly known as:  HALDOL What changed: You were already taking a medication with the same name, and this prescription was added. Make sure you understand how and when to take each. Your last dose was: Your next dose is:    
   
   
 Dose:  5 mg Take 1 Tab by mouth two (2) times a day for 30 days. Indications: SCHIZOPHRENIA Quantity:  60 Tab Refills:  0  
     
   
   
   
  
 * methocarbamol 750 mg tablet Commonly known as:  ROBAXIN What changed:  Another medication with the same name was added. Make sure you understand how and when to take each. Your last dose was: Your next dose is:    
   
   
 Dose:  750 mg Take 1 Tab by mouth four (4) times daily. Quantity:  12 Tab Refills:  0  
     
   
   
   
  
 * methocarbamol 750 mg tablet Commonly known as:  ROBAXIN What changed: You were already taking a medication with the same name, and this prescription was added. Make sure you understand how and when to take each. Your last dose was: Your next dose is:    
   
   
 Dose:  750 mg Take 1 Tab by mouth four (4) times daily for 30 days. Indications: MUSCLE SPASM Quantity:  120 Tab Refills:  0  
     
   
   
   
  
 * Notice: This list has 4 medication(s) that are the same as other medications prescribed for you. Read the directions carefully, and ask your doctor or other care provider to review them with you. CONTINUE these medications which have NOT CHANGED Dose & Instructions Dispensing Information Comments Morning Noon Evening Bedtime  
 albuterol 90 mcg/actuation inhaler Commonly known as:  PROVENTIL HFA, VENTOLIN HFA, PROAIR HFA Your last dose was: Your next dose is:    
   
   
 Dose:  2 Puff Take 2 Puffs by inhalation every six (6) hours as needed for Wheezing. Indications: BRONCHOSPASM PREVENTION Quantity:  1 Inhaler Refills:  3 STOP taking these medications WELLBUTRIN 100 mg tablet Generic drug:  buPROPion Replaced by:  buPROPion  mg SR tablet Where to Get Your Medications These medications were sent to [facility] Aryan Melvin [address]. [address] [place] [address] Phone:  [phone]  
  buPROPion  mg SR tablet Information on where to get these meds will be given to you by the nurse or doctor. ! Ask your nurse or doctor about these medications  
  benztropine 1 mg tablet  
 haloperidol 5 mg tablet  
 methocarbamol 750 mg tablet  
 neomycin-bacitracin-polymyxin 3.5mg-400 unit- 5,000 unit/gram ointment  
 zolpidem 5 mg tablet

## 2017-04-24 NOTE — ED NOTES
[facility]  [facility] EMERGENCY DEPT      29 y.o. male with noted past medical history who presents to the emergency department with suicidal ideations and plan. I performed a brief evaluation, including history and physical, of the patient here in triage and I have determined that pt will need further treatment and evaluation from the main side ER physician. I have placed initial orders to help in expediting patients care. Theo Mccord M.D.

## 2017-04-24 NOTE — ED NOTES
Bedside shift change report given to Pressley Fleischer (oncoming nurse) by Avila Bians RN   (offgoing nurse). Report included the following information SBAR, ED Summary and MAR.

## 2017-04-25 LAB
CHOLEST SERPL-MCNC: 166 MG/DL
HBA1C MFR BLD: 5.3 % (ref 4.2–5.6)
HDLC SERPL-MCNC: 46 MG/DL (ref 40–60)
HDLC SERPL: 3.6 {RATIO} (ref 0–5)
LDLC SERPL CALC-MCNC: 105.6 MG/DL (ref 0–100)
LIPID PROFILE,FLP: ABNORMAL
TRIGL SERPL-MCNC: 72 MG/DL (ref ?–150)
VLDLC SERPL CALC-MCNC: 14.4 MG/DL

## 2017-04-25 PROCEDURE — 74011250637 HC RX REV CODE- 250/637: Performed by: NURSE PRACTITIONER

## 2017-04-25 PROCEDURE — 74011250637 HC RX REV CODE- 250/637: Performed by: PSYCHIATRY & NEUROLOGY

## 2017-04-25 PROCEDURE — 83036 HEMOGLOBIN GLYCOSYLATED A1C: CPT | Performed by: PSYCHIATRY & NEUROLOGY

## 2017-04-25 PROCEDURE — 65220000005 HC RM SEMIPRIVATE PSYCH 3 OR 4 BED

## 2017-04-25 PROCEDURE — 36415 COLL VENOUS BLD VENIPUNCTURE: CPT | Performed by: PSYCHIATRY & NEUROLOGY

## 2017-04-25 PROCEDURE — 80061 LIPID PANEL: CPT | Performed by: PSYCHIATRY & NEUROLOGY

## 2017-04-25 RX ORDER — HALOPERIDOL 5 MG/1
5 TABLET ORAL
Status: DISCONTINUED | OUTPATIENT
Start: 2017-04-25 | End: 2017-05-01 | Stop reason: HOSPADM

## 2017-04-25 RX ORDER — TRAZODONE HYDROCHLORIDE 50 MG/1
50 TABLET ORAL
Status: DISCONTINUED | OUTPATIENT
Start: 2017-04-25 | End: 2017-05-01 | Stop reason: HOSPADM

## 2017-04-25 RX ORDER — ALBUTEROL SULFATE 90 UG/1
2 AEROSOL, METERED RESPIRATORY (INHALATION)
Status: DISCONTINUED | OUTPATIENT
Start: 2017-04-25 | End: 2017-04-25 | Stop reason: CLARIF

## 2017-04-25 RX ORDER — HYDROXYZINE PAMOATE 50 MG/1
50 CAPSULE ORAL
Status: DISCONTINUED | OUTPATIENT
Start: 2017-04-25 | End: 2017-05-01 | Stop reason: HOSPADM

## 2017-04-25 RX ORDER — LORAZEPAM 2 MG/ML
1-2 INJECTION INTRAMUSCULAR
Status: DISCONTINUED | OUTPATIENT
Start: 2017-04-25 | End: 2017-05-01 | Stop reason: HOSPADM

## 2017-04-25 RX ORDER — BENZTROPINE MESYLATE 1 MG/1
1 TABLET ORAL
Status: DISCONTINUED | OUTPATIENT
Start: 2017-04-25 | End: 2017-05-01 | Stop reason: HOSPADM

## 2017-04-25 RX ORDER — IBUPROFEN 600 MG/1
600 TABLET ORAL
Status: DISCONTINUED | OUTPATIENT
Start: 2017-04-25 | End: 2017-05-01 | Stop reason: HOSPADM

## 2017-04-25 RX ORDER — HALOPERIDOL 5 MG/1
5 TABLET ORAL 2 TIMES DAILY
Status: DISCONTINUED | OUTPATIENT
Start: 2017-04-25 | End: 2017-05-01 | Stop reason: HOSPADM

## 2017-04-25 RX ORDER — METHOCARBAMOL 500 MG/1
1000 TABLET, FILM COATED ORAL 4 TIMES DAILY
Status: DISCONTINUED | OUTPATIENT
Start: 2017-04-25 | End: 2017-04-26

## 2017-04-25 RX ORDER — ZOLPIDEM TARTRATE 5 MG/1
5 TABLET ORAL
Status: DISCONTINUED | OUTPATIENT
Start: 2017-04-25 | End: 2017-05-01 | Stop reason: HOSPADM

## 2017-04-25 RX ORDER — HALOPERIDOL 5 MG/ML
5 INJECTION INTRAMUSCULAR
Status: DISCONTINUED | OUTPATIENT
Start: 2017-04-25 | End: 2017-05-01 | Stop reason: HOSPADM

## 2017-04-25 RX ORDER — ALBUTEROL SULFATE 0.83 MG/ML
2.5 SOLUTION RESPIRATORY (INHALATION)
Status: DISCONTINUED | OUTPATIENT
Start: 2017-04-25 | End: 2017-05-01 | Stop reason: HOSPADM

## 2017-04-25 RX ORDER — BUPROPION HYDROCHLORIDE 150 MG/1
150 TABLET, EXTENDED RELEASE ORAL DAILY
Status: DISCONTINUED | OUTPATIENT
Start: 2017-04-25 | End: 2017-05-01 | Stop reason: HOSPADM

## 2017-04-25 RX ADMIN — BENZTROPINE MESYLATE 1 MG: 1 TABLET ORAL at 20:40

## 2017-04-25 RX ADMIN — BACITRACIN ZINC, NEOMYCIN, POLYMYXIN B: 400; 3.5; 5 OINTMENT TOPICAL at 21:20

## 2017-04-25 RX ADMIN — METHOCARBAMOL 1000 MG: 500 TABLET ORAL at 17:24

## 2017-04-25 RX ADMIN — METHOCARBAMOL 1000 MG: 500 TABLET ORAL at 20:40

## 2017-04-25 RX ADMIN — HALOPERIDOL 5 MG: 5 TABLET ORAL at 20:40

## 2017-04-25 RX ADMIN — BUPROPION HYDROCHLORIDE 150 MG: 150 TABLET, EXTENDED RELEASE ORAL at 08:24

## 2017-04-25 RX ADMIN — ZOLPIDEM TARTRATE 5 MG: 5 TABLET ORAL at 20:40

## 2017-04-25 RX ADMIN — HALOPERIDOL 5 MG: 5 TABLET ORAL at 08:24
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
## 2017-04-25 NOTE — ED NOTES
TRANSFER - OUT REPORT:    Verbal report given to [facility], RN(name) on Debra Urena  being transferred to Lexa Rivas RN  (unit) for routine progression of care       Report consisted of patients Situation, Background, Assessment and   Recommendations(SBAR). Information from the following report(s) SBAR, Kardex, Intake/Output and MAR was reviewed with the receiving nurse. Opportunity for questions and clarification was provided.       Patient transported with:   Tech and security

## 2017-04-25 NOTE — PROGRESS NOTES
Problem: Suicide/Homicide (Adult/Pediatric)  Goal: *STG: Remains safe in hospital  Patient to remain safe every shift every day while hospitalized. Outcome: Progressing Towards Goal  Pt has made not attempts to harm self or others   Goal: *STG: Attends activities and groups  Patient to attend 2-3 group therapy every day while hospitalized. Outcome: Progressing Towards Goal  Attending     Problem: Depressed Mood (Adult/Pediatric)  Goal: *STG: Complies with medication therapy  Patient to take prescribed medications as scheduled every shift every day while hospitalized. Outcome: Progressing Towards Goal  Compliant     Comments:   Pt is pleasant and cooperative. He states he does not feel suicidal or homicidal at this time. He continues to endorse auditory hallucinations and states the voices are laughing at him. Pt is compliant with medications and attending groups. He is asking for  to talk with him about housing and follow up.  Lore Collins called and informed that pt wasn't to speak with him.

## 2017-04-25 NOTE — PROGRESS NOTES
Albuterol NEB was therapeutically interchanged for Albuterol INH  per the P&T Committee approved Therapeutic Interchanges Policy.     Ruthie Valdivia [facility], Pharmacist  4/25/2017 2:14 AM
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
## 2017-04-25 NOTE — BH NOTES
GROUP THERAPY PROGRESS NOTE    Merari Ennis is participating in [place].      Group time: 30 minutes    Personal goal for participation: have a good day    Goal orientation: community    Group therapy participation: minimal    Therapeutic interventions reviewed and discussed: goals and procedures    Impression of participation: encouraged

## 2017-04-25 NOTE — ED NOTES
Pt aox4. Pt reports hearing voices. Pt admits to thoughts of SI. Pt denies HI. Pt denies plan. Pt given dinner. No distress noted.
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
## 2017-04-25 NOTE — ED NOTES
ADDENDUM: Patient care transferred to myself from Dr. Cy Meek pending crisis eval. Patient was seen by Frannie La from crisis and will be admitted here for further psychiatric care.      Amilcar Edmond MD.

## 2017-04-25 NOTE — ED NOTES
Pt aox3. Vitals stable. Pt resting comfortably in bed. Pt updated on bed assignment. Security called for transport.

## 2017-04-25 NOTE — BSMART NOTE
Pt seen by Crisis in ED room Earl by 1/A       CC: SI, HI , command AH to hurt self and others. Pt is alert, oriented, cooperative. Pt endorsed SI with thoughts to OD. Pt denies intent to harm others. Pt reports command AH to hurt self and others. \" different voices tell him different things, not worth anything, and take out your rage throw someone against the wall. Pt denies hx of aggression or acting on his AH. Pt reports \" I always get help before I do anything like that. \"        Pt reports compliance with his medications and out pt tx. He is followed by a NP in [facility] of [place] named Jennifer Squires. Pt reports he is on and taking Haldol 5 mg bid , Cogentin 1 mg hs , Wellbutrin 150 mg ER daily. He is also on Albuterol inhaler prn. Pt was restarted on his medications one week ago. Pt is currently staying at the [facility] in [place] as he was put out of his housing with a friend because he was not on the lease. Pt requires in pt tx for safety, further evaluation and medication management due to dangerousness to self and others. Discussed with on call Psychiatrist , orders received for adm.

## 2017-04-25 NOTE — BH NOTES
Patient admitted to the unit is a 29 yr old male due to patient having suicidal ideations, and homicidal ideations, and hearing voices telling him to hurt himself, and to hurt other people, patient was also reported to have a plan to overdose. Patient is cooperative during nursing admission patient denies thoughts of suicide, patient states that he hears voices telling him to hurt other people, patient denied delusions, patient contracted for safety. Patient states that he was staying at [facility] then a family member stated that he could stay with them, and that's where he was staying at, and that yesterday he went to the hospital due to his throat hurting, and that's when the family member he was staying with brought all his clothes to the hospital, and stated that they didn't want him to stay with them anymore and that has caused him to be depressed and to be hurt. Patient states that he receives disability patient is currently in his room will continue to monitor.

## 2017-04-25 NOTE — BSMART NOTE
OCCUPATIONAL THERAPY PROGRESS NOTE  Group Time:  3144  Attendance: The patient attended full group. Participation:  The patient participated fully in the activity. Attention:  The patient was able to focus on the activity. Interaction:  The patient occasionally  interacts with others. Appropriate in responses. Pleasant on approach. Helpful to peers in activity.

## 2017-04-26 PROCEDURE — 65220000005 HC RM SEMIPRIVATE PSYCH 3 OR 4 BED

## 2017-04-26 PROCEDURE — 74011250637 HC RX REV CODE- 250/637: Performed by: PSYCHIATRY & NEUROLOGY

## 2017-04-26 RX ORDER — METHOCARBAMOL 500 MG/1
750 TABLET, FILM COATED ORAL 4 TIMES DAILY
Status: DISCONTINUED | OUTPATIENT
Start: 2017-04-26 | End: 2017-05-01 | Stop reason: HOSPADM

## 2017-04-26 RX ADMIN — METHOCARBAMOL 750 MG: 500 TABLET ORAL at 20:26

## 2017-04-26 RX ADMIN — BACITRACIN ZINC, NEOMYCIN, POLYMYXIN B: 400; 3.5; 5 OINTMENT TOPICAL at 20:30

## 2017-04-26 RX ADMIN — BUPROPION HYDROCHLORIDE 150 MG: 150 TABLET, EXTENDED RELEASE ORAL at 08:19

## 2017-04-26 RX ADMIN — ZOLPIDEM TARTRATE 5 MG: 5 TABLET ORAL at 20:26

## 2017-04-26 RX ADMIN — METHOCARBAMOL 1000 MG: 500 TABLET ORAL at 12:09

## 2017-04-26 RX ADMIN — METHOCARBAMOL 750 MG: 500 TABLET ORAL at 17:07

## 2017-04-26 RX ADMIN — BACITRACIN ZINC, NEOMYCIN, POLYMYXIN B: 400; 3.5; 5 OINTMENT TOPICAL at 09:12

## 2017-04-26 RX ADMIN — METHOCARBAMOL 1000 MG: 500 TABLET ORAL at 08:19

## 2017-04-26 RX ADMIN — BENZTROPINE MESYLATE 1 MG: 1 TABLET ORAL at 20:26

## 2017-04-26 RX ADMIN — HALOPERIDOL 5 MG: 5 TABLET ORAL at 20:26

## 2017-04-26 RX ADMIN — HALOPERIDOL 5 MG: 5 TABLET ORAL at 08:20

## 2017-04-26 NOTE — BH NOTES
GROUP THERAPY PROGRESS NOTE    Mary Miguel is participating in [place].      Group time: 30 minutes    Personal goal for participation: discuss daily Tx goal(s), discuss unit issues and guideline compliance               Goal orientation: personal    Group therapy participation: minimal

## 2017-04-26 NOTE — BSMART NOTE
OCCUPATIONAL THERAPY PROGRESS NOTE    Group Time:  7804  Attendance: The patient attended full group. .  Participation:  The patient participated fully in the activity. Attention:  The patient was able to focus on the activity. Interaction:  The patient frequently interacts with others. Affect bright and patient talkative, but, not intrusive or hyper-verbal.  Interactions appropriate and on topic.

## 2017-04-26 NOTE — BSMART NOTE
ART THERAPY GROUP PROGRESS NOTE    PATIENT SCHEDULED FOR GROUP AT: 14:15    ATTENDANCE: Full    PARTICIPATION LEVEL: Participates fully in the art process    ATTENTION LEVEL: Able to focus on task    FOCUS: Problem-solving    SYMBOLIC & THEMATIC CONTENT AS NOTED IN IMAGERY: He was compliant, and invested in the task at hand. He was alert and presented with a bright affect. He was concerned regarding his next move for housing and discussed options within group.

## 2017-04-26 NOTE — PROGRESS NOTES
Behavioral Health Progress Note      4/26/2017    Feliberto Flores    Current Diagnosis: Schizophrenia, Paranoid and Anxiety NOS    Report from the nursing staff changes in the medical condition while patient has been on the unit:    Patient Vitals for the past 24 hrs:   Temp Pulse Resp BP   04/25/17 1930 97.8 °F (36.6 °C) 61 17 107/65       Recent Results (from the past 24 hour(s))   LIPID PANEL    Collection Time: 04/25/17 11:11 AM   Result Value Ref Range    LIPID PROFILE          Cholesterol, total 166 <200 MG/DL    Triglyceride 72 <150 MG/DL    HDL Cholesterol 46 40 - 60 MG/DL    LDL, calculated 105.6 (H) 0 - 100 MG/DL    VLDL, calculated 14.4 MG/DL    CHOL/HDL Ratio 3.6 0 - 5.0     HEMOGLOBIN A1C W/O EAG    Collection Time: 04/25/17 11:11 AM   Result Value Ref Range    Hemoglobin A1c 5.3 4.2 - 5.6 %       Sleep:shows no evidence of impairment    Interval Hx: Patient reports feeling better w/ NO more CAH. Patient reports that he feels better and his affect seems brighter. He also reports that his mood is less depressed. Patient also reports that his pain is improved w/ current muscle relaxant. He denies H/S/I/P. Patient has been calm, pleasant, and has been compliant w/ meds but he would like the Robaxi dosage to decrease due to grogginess. . Per staff patient has been attending all psychotherapy group sessions, participating in unit activities, and comforting peers. NO neurovegetative si/x reported. Mental Status Exam: Affect/Mood are brighter and less depressed. Insight/Judgment are improving. Treatment Plan:  - Order Lipid profile: WNL: Patient denies hx.  -Order HgAIC: WNL: Patient denies hx  -Psychotropic medications:  -1. Continue: Haldol 5 mg PO bid  -2. Continue: Cogentin 1 mg PO qhs  -3. Continue: Wellbutrin  mg PO daily. -4. Continue: Ambien 10 mg PO qHS  -5.  Decrease: Robaxin 1000 mg to 750 mg PO QID     Anticipated Discharge: TBD    Daniel Robison MD  4/26/2017

## 2017-04-26 NOTE — BH NOTES
Franki Cabot is participating in Music Therapy. Group time: 30 minutes    Personal goal for participation:  Relax while listening to Aromatherapy music    Goal orientation: relaxation    Group therapy participation: active    Therapeutic interventions reviewed and discussed: Staff encouraged Pt. To participate in listening to soft music    Impression of participation:  Pt.  Was calm  And relax while listening to music

## 2017-04-26 NOTE — PROGRESS NOTES
conducted an initial consultation and Spiritual Assessment for Brittaney Bentley, who is a 29 y. o.,male. Patients Primary Language is: [place]. According to the patients EMR Orthodox Affiliation is: Jorge [place]. The reason the Patient came to the hospital is:   Patient Active Problem List    Diagnosis Date Noted    Pure hypercholesterolemia 10/01/2010    Dysthymic disorder 10/01/2010    Schizoaffective disorder ([address] 10/01/2010    Asthma 10/01/2010    Obesity 10/01/2010        The  provided the following Interventions:  Initiated a relationship of care and support. Explored issues of darron, belief, spirituality and Rastafari/ritual needs while hospitalized. Listened empathically. Provided chaplaincy education. Provided information about Spiritual Care Services. Offered prayer and assurance of continued prayers on patient's behalf. Chart reviewed. The following outcomes where achieved:  Patient shared limited information about both their medical narrative and spiritual journey/beliefs.  confirmed Patient's Orthodox Affiliation. Patient processed feeling about current hospitalization. Patient expressed gratitude for 's visit. Assessment:  Patient does not have any Rastafari/cultural needs that will affect patients preferences in health care. There are no spiritual or Rastafari issues which require intervention at this time. Plan:  Chaplains will continue to follow and will provide pastoral care on an as needed/requested basis.  recommends bedside caregivers page  on duty if patient shows signs of acute spiritual or emotional distress.       [address] [facility]   [phone]

## 2017-04-26 NOTE — PROGRESS NOTES
Problem: Suicide/Homicide (Adult/Pediatric)  Goal: *STG: Remains safe in hospital  Patient to remain safe every shift every day while hospitalized. Outcome: Progressing Towards Goal  No attempts to harm self or others   Goal: *STG: Attends activities and groups  Patient to attend 2-3 group therapy every day while hospitalized. Outcome: Progressing Towards Goal  Attending     Problem: Depressed Mood (Adult/Pediatric)  Goal: *STG: Participates in treatment plan  Patient to participate in treatment plan every day while hospitalized. Outcome: Progressing Towards Goal  Participating in all aspects of treatment plan   Goal: *STG: Complies with medication therapy  Patient to take prescribed medications as scheduled every shift every day while hospitalized. Outcome: Progressing Towards Goal  Compliant     Comments:   Pt is pleasant and cooperative. He is participating in groups and compliant with medications. Pt continues to endorse auditory hallucinations but denies any thoughts of harming himself or others. He talked a long time about his plans after discharge. He states he got his social security straight and and a card for his money is being sent to the [employer] [facility] and all he has to do is pick it up. Pt states he wants to move back to [place] because the cost of living is cheaper and he has lots of friends there.

## 2017-04-27 PROCEDURE — 74011250637 HC RX REV CODE- 250/637: Performed by: PSYCHIATRY & NEUROLOGY

## 2017-04-27 PROCEDURE — 65220000005 HC RM SEMIPRIVATE PSYCH 3 OR 4 BED

## 2017-04-27 RX ADMIN — METHOCARBAMOL 750 MG: 500 TABLET ORAL at 16:57

## 2017-04-27 RX ADMIN — METHOCARBAMOL 750 MG: 500 TABLET ORAL at 21:33

## 2017-04-27 RX ADMIN — BACITRACIN ZINC, NEOMYCIN, POLYMYXIN B: 400; 3.5; 5 OINTMENT TOPICAL at 09:08

## 2017-04-27 RX ADMIN — METHOCARBAMOL 750 MG: 500 TABLET ORAL at 12:05

## 2017-04-27 RX ADMIN — HALOPERIDOL 5 MG: 5 TABLET ORAL at 21:34

## 2017-04-27 RX ADMIN — ZOLPIDEM TARTRATE 5 MG: 5 TABLET ORAL at 21:34

## 2017-04-27 RX ADMIN — BENZTROPINE MESYLATE 1 MG: 1 TABLET ORAL at 21:34

## 2017-04-27 RX ADMIN — BUPROPION HYDROCHLORIDE 150 MG: 150 TABLET, EXTENDED RELEASE ORAL at 08:15

## 2017-04-27 RX ADMIN — BACITRACIN ZINC, NEOMYCIN, POLYMYXIN B: 400; 3.5; 5 OINTMENT TOPICAL at 21:35

## 2017-04-27 RX ADMIN — METHOCARBAMOL 750 MG: 500 TABLET ORAL at 08:14

## 2017-04-27 RX ADMIN — HALOPERIDOL 5 MG: 5 TABLET ORAL at 09:17

## 2017-04-27 NOTE — BSMART NOTE
OCCUPATIONAL THERAPY PROGRESS NOTE  Group Time:  1752  Attendance: The patient attended 3/4 of group. Participation:  The patient participated fully in the activity. Attention:  The patient was able to focus on the activity. Interaction:  The patient frequently interacts with others.

## 2017-04-27 NOTE — BSMART NOTE
GROUP THERAPY PROGRESS NOTE    July Heredia did not  Participated  in [employer] and Positive thoughts group in even with staff encouragement

## 2017-04-27 NOTE — BSMART NOTE
SW Contact:  Pt upbeat some that his social security check situation is mostly resolved & appreciated Phone #'s Jerem[place] Newsome gave him. Funds to be sent to local shelter. Continue to educate pt on basics of structure & \"daily\" schedule as well as some CBT principles. .. D/C plan includes staying [employer] after here, getting MONEY sent to [facility] he's getting wired from friend & soon there after relocating back to [place]. .. Also reviewed more CBT principles.

## 2017-04-27 NOTE — PROGRESS NOTES
Behavioral Health Progress Note      4/27/2017    Ashley Bryant    Current Diagnosis: Schizophrenia, Paranoid and Anxiety NOS    Report from the nursing staff changes in the medical condition while patient has been on the unit:    Patient Vitals for the past 24 hrs:   Temp Pulse Resp BP   04/27/17 0700 98.3 °F (36.8 °C) 60 20 102/78   04/26/17 1900 97.9 °F (36.6 °C) 70 16 117/80       No results found for this or any previous visit (from the past 24 hour(s)). Sleep:shows no evidence of impairment    Sleep:shows no evidence of impairment     Interval Hx: Patient reports feeling much better but having fleeting A/H but NO more CAH. Patient is less depressed. Patient also reports that his pain is improved w/ current muscle relaxant. He denies H/S/I/P. Patient has been calm, pleasant, and has been compliant w/ meds and tolerating them better since dosage was decreased. Per staff patient has been attending all psychotherapy group sessions and  participating in unit activities. Patient continues to endorse the he would like to move back to [place] because the standard of living is less and he has more supports. NO neurovegetative si/x reported.      Mental Status Exam: Affect/Mood are brighter. Insight/Judgment are improving. Treatment Plan:  - Lipid profile: WNL: Patient denies hx.  - HgAIC: WNL: Patient denies hx    -Psychotropic medications:  -1. Continue: Haldol 5 mg PO bid  -2. Continue: Cogentin 1 mg PO qhs  -3. Continue: Wellbutrin  mg PO daily. -4. Continue: Ambien 10 mg PO qHS  -5.  Continue: Robaxin 750 mg PO QID     Anticipated Discharge: Patient will call around for housing options after discharge: Monday 5/1/2017    Manpreet Nicole MD  4/27/2017

## 2017-04-27 NOTE — BH NOTES
GROUP THERAPY PROGRESS NOTE    Brittaney Bentley is not participating in Recreational Therapy. Pt chose to rest in bed, despite staff encouragement.

## 2017-04-28 PROCEDURE — 74011250637 HC RX REV CODE- 250/637: Performed by: PSYCHIATRY & NEUROLOGY

## 2017-04-28 PROCEDURE — 65220000005 HC RM SEMIPRIVATE PSYCH 3 OR 4 BED

## 2017-04-28 RX ADMIN — METHOCARBAMOL 750 MG: 500 TABLET ORAL at 08:09

## 2017-04-28 RX ADMIN — BACITRACIN ZINC, NEOMYCIN, POLYMYXIN B: 400; 3.5; 5 OINTMENT TOPICAL at 08:42

## 2017-04-28 RX ADMIN — ZOLPIDEM TARTRATE 5 MG: 5 TABLET ORAL at 20:21

## 2017-04-28 RX ADMIN — IBUPROFEN 600 MG: 600 TABLET, FILM COATED ORAL at 11:33

## 2017-04-28 RX ADMIN — HALOPERIDOL 5 MG: 5 TABLET ORAL at 08:09

## 2017-04-28 RX ADMIN — BACITRACIN ZINC, NEOMYCIN, POLYMYXIN B: 400; 3.5; 5 OINTMENT TOPICAL at 20:24

## 2017-04-28 RX ADMIN — BENZTROPINE MESYLATE 1 MG: 1 TABLET ORAL at 20:21

## 2017-04-28 RX ADMIN — BUPROPION HYDROCHLORIDE 150 MG: 150 TABLET, EXTENDED RELEASE ORAL at 08:09

## 2017-04-28 RX ADMIN — METHOCARBAMOL 750 MG: 500 TABLET ORAL at 20:21

## 2017-04-28 RX ADMIN — METHOCARBAMOL 750 MG: 500 TABLET ORAL at 17:58

## 2017-04-28 RX ADMIN — HALOPERIDOL 5 MG: 5 TABLET ORAL at 20:21

## 2017-04-28 RX ADMIN — METHOCARBAMOL 750 MG: 500 TABLET ORAL at 12:37

## 2017-04-28 NOTE — PROGRESS NOTES
Behavioral Health Progress Note      4/28/2017    [facility]    Current Diagnosis: Schizophrenia, Paranoid and Anxiety NOS    Report from the nursing staff changes in the medical condition while patient has been on the unit:    Patient Vitals for the past 24 hrs:   Temp Pulse Resp BP   04/28/17 0800 96.8 °F (36 °C) 68 18 121/83   04/27/17 1800 98.3 °F (36.8 °C) 65 17 97/67       No results found for this or any previous visit (from the past 24 hour(s)). Sleep:shows no evidence of impairment    Sleep:shows no evidence of impairment      Interval Hx: Patient reports that he will be ready to be discharged on this coming Monday. Patient is denying all medically and psychiatrically symptoms. He just want to stay for reassurances that he will be well and meds will work after he is discharged. He denies A/V/H and H/S/I/P. Patient has been calm, pleasant, and has been compliant w/ meds and tolerating them well. Per staff patient has been attending all psychotherapy group sessions and participating in unit activities. Patient continues to endorse the he would like to move back to [place] and will have a plan by Monday. NO neurovegetative si/x reported.       Mental Status Exam: Affect/Mood are brighter. Insight/Judgment are improving.      Treatment Plan:  - Lipid profile: WNL: Patient denies hx.  - HgAIC: WNL: Patient denies hx     -Psychotropic medications:  -1. Continue: Haldol 5 mg PO bid  -2. Continue: Cogentin 1 mg PO qhs  -3. Continue: Wellbutrin  mg PO daily. -4. Continue: Ambien 10 mg PO qHS  -5.  Continue: Robaxin 750 mg PO QID      Anticipated Discharge: Patient will continues to call around for housing options until discharge: Monday 5/1/2017    Pavel Chew MD  4/28/2017

## 2017-04-28 NOTE — BH NOTES
GROUP THERAPY PROGRESS NOTE    Ashley Bryant is participating in [place].      Group time: 30 minutes    Personal goal for participation: have good day    Goal orientation: community    Group therapy participation: minimal    Therapeutic interventions reviewed and discussed: goals and procedures    Impression of participation: encouraged

## 2017-04-28 NOTE — BSMART NOTE
SW Contact:  Continue to look at d/c plan regarding shelter & obtaining finances through money wired to [employer]. Goal continues to be relocate back to [place]. Affect still bright but some increased anxiety with discussing d/c plan.  He had gotten some suggestions from mom of other tx options closer, but processed how to discuss with mom positives of relocating back to [place]

## 2017-04-28 NOTE — PROGRESS NOTES
Problem: Suicide/Homicide (Adult/Pediatric)  Goal: *STG: Remains safe in hospital  Patient to remain safe every shift every day while hospitalized. Outcome: Progressing Towards Goal  Remains safe. Problem: Depressed Mood (Adult/Pediatric)  Goal: *STG: Complies with medication therapy  Patient to take prescribed medications as scheduled every shift every day while hospitalized. Outcome: Progressing Towards Goal  Med compliant. Problem: Falls - Risk of  Goal: *Absence of falls  Outcome: Progressing Towards Goal  No falls. Comments:   Pt was pleasant 1:1, stated \" I'm blessed as always \". Denies hallucinations depression and ideations. Stated he was depressed and endorsing voices coming to the hospital but not now. Stated being back on his medicines has helped. He's diet and med compliant. Behavior been cooperative.

## 2017-04-28 NOTE — BH NOTES
Pt calm, cooperative, and very respectful. Pt is enthusiatic about discharging and is hoping that his plans come together. Pt did not have visitors this shift, but was on the phone squaring away steps following discharge. Pt has been social with peers and staff this shift. His biggest focus has been on watching the [employer] Drafts this evening. Pt ate 90% of his dinner meal and 100% of his snack. Pt is wearing non-skid socks and is free from falls. Pt contracts for safety on the unit.  Pt denies S/i and H/I.

## 2017-04-29 PROCEDURE — 74011250637 HC RX REV CODE- 250/637: Performed by: PSYCHIATRY & NEUROLOGY

## 2017-04-29 PROCEDURE — 65220000005 HC RM SEMIPRIVATE PSYCH 3 OR 4 BED

## 2017-04-29 RX ADMIN — METHOCARBAMOL 750 MG: 500 TABLET ORAL at 12:43

## 2017-04-29 RX ADMIN — HALOPERIDOL 5 MG: 5 TABLET ORAL at 20:20

## 2017-04-29 RX ADMIN — METHOCARBAMOL 750 MG: 500 TABLET ORAL at 08:18

## 2017-04-29 RX ADMIN — METHOCARBAMOL 750 MG: 500 TABLET ORAL at 20:19

## 2017-04-29 RX ADMIN — METHOCARBAMOL 750 MG: 500 TABLET ORAL at 18:32

## 2017-04-29 RX ADMIN — BUPROPION HYDROCHLORIDE 150 MG: 150 TABLET, EXTENDED RELEASE ORAL at 08:18

## 2017-04-29 RX ADMIN — ZOLPIDEM TARTRATE 5 MG: 5 TABLET ORAL at 20:20

## 2017-04-29 RX ADMIN — HALOPERIDOL 5 MG: 5 TABLET ORAL at 08:19

## 2017-04-29 RX ADMIN — BACITRACIN ZINC, NEOMYCIN, POLYMYXIN B: 400; 3.5; 5 OINTMENT TOPICAL at 08:58

## 2017-04-29 RX ADMIN — BENZTROPINE MESYLATE 1 MG: 1 TABLET ORAL at 20:20

## 2017-04-29 RX ADMIN — BACITRACIN ZINC, NEOMYCIN, POLYMYXIN B: 400; 3.5; 5 OINTMENT TOPICAL at 21:00

## 2017-04-29 NOTE — PROGRESS NOTES
Problem: Suicide/Homicide (Adult/Pediatric)  Goal: *STG: Remains safe in hospital  Patient to remain safe every shift every day while hospitalized. Outcome: Progressing Towards Goal  Patient has remained free of harm to self and others. Problem: Depressed Mood (Adult/Pediatric)  Goal: *STG: Complies with medication therapy  Patient to take prescribed medications as scheduled every shift every day while hospitalized. Outcome: Progressing Towards Goal  Patient has been compliant with prescribed medication. Comments:   Patient has been cooperative and pleasant upon approach with medication compliance. Patient has been social and interactive with peers and staff. Patient advised writer that his transportation for travel back to [place]. Patient has reported eating and sleeping adequately and is optimistic regarding discharging and traveling to his home. Patient denies suicidal ideation with no safety issues  Noted, denies auditory hallucinations. Will continue to monitor for safety with support as needed throughout treatment regimen.

## 2017-04-29 NOTE — BH NOTES
GROUP THERAPY PROGRESS NOTE    Jung Chandler is not participating in [place].      Group time: 30 minutes    Personal goal for participation: none    Goal orientation: community    Group therapy participation: refused    Therapeutic interventions reviewed and discussed: goals and procedures    Impression of participation: encouraged

## 2017-04-29 NOTE — BH NOTES
GROUP THERAPY PROGRESS NOTE    Maxine Ybarra is participating in Recreational Therapy. Group time: 30 minutes    Personal goal for participation: Physical activity/Socialize    Goal orientation: social    Group therapy participation: active    Therapeutic interventions reviewed and discussed: Social    Impression of participation: Pt fully active physically and socially. Pt played H.O.R.S.E. (basketball) and foosball with peers. Pt was excited to go outside and get off the unit for awhile. Pt thanked the staff.

## 2017-04-29 NOTE — BH NOTES
GROUP THERAPY PROGRESS NOTE    Serenity Hatfield is participating in [place]. Group time: 15 minutes    Personal goal for participation: Community Annoucement    Goal orientation: community    Group therapy participation: active    Therapeutic interventions reviewed and discussed: Discussed unit schedule, visiting hours, housekeeping/maintenance issues, addressed program concerns and unit guidelines. Impression of participation: Pt did not  have issues or concerns at this time.

## 2017-04-29 NOTE — BH NOTES
GROUP THERAPY PROGRESS NOTE    Erick Jeffries is not participating in unit activities despite education and encouragement towards treatment compliance. .     Group time: 0 minutes    Personal goal for participation: Not in attendance    Goal orientation: Non in attendance    Group therapy participation: Not in Attendance     Therapeutic interventions reviewed and discussed: Not in Attendance    Impression of participation: Not in Attendance

## 2017-04-29 NOTE — PROGRESS NOTES
Behavioral Health Progress Note      4/29/2017    [facility]    Current Diagnosis: Schizophrenia, Paranoid and Anxiety NOS    Report from the nursing staff changes in the medical condition while patient has been on the unit:    Patient Vitals for the past 24 hrs:   Temp Pulse Resp BP   04/29/17 0753 97.4 °F (36.3 °C) 61 18 116/84   04/28/17 1800 97.6 °F (36.4 °C) 79 17 113/77       No results found for this or any previous visit (from the past 24 hour(s)). Sleep:shows no evidence of impairment    Interval Hx: Patient reports looking forward to be discharged on Monday. Patient is denying all medically and psychiatrically symptoms. He denies A/V/H and H/S/I/P. Patient has been calm, pleasant, and has been compliant w/ meds and tolerating them well. Per staff patient has been attending all psychotherapy group sessions and participating in unit activities. Patient continues to endorse the he would like to move back to [place] but his mother is encouraged him to stay in [place] closer to her. NO neurovegetative si/x reported.       Mental Status Exam: Affect/Mood are bright. Insight/Judgment are improving.       Treatment Plan:  - Lipid profile: WNL: Patient denies hx.  - HgAIC: WNL: Patient denies hx      -Psychotropic medications:  -1. Continue: Haldol 5 mg PO bid  -2. Continue: Cogentin 1 mg PO qhs  -3. Continue: Wellbutrin  mg PO daily. -4. Continue: Ambien 10 mg PO qHS  -5.  Continue: Robaxin 750 mg PO QID      Anticipated Discharge: Patient will continues to call around for housing options until discharge: Monday 5/1/2017     Benoit Urena MD  4/29/2017

## 2017-04-29 NOTE — BH NOTES
Pt calm and cooperative this shift; states he \"goes with the flow\". Pt has not been a management problem. Spent majority of the shift in the day area and on the phone, continuing to make arrangements for discharge. Pt looking forward to starting new and states he has a great support system. Pt did not have visitors this shift. Pt attended Recreational group and Community group this evening. Pt ate 100% of his dinner meal and 100% of his snack. Pt is wearing non-skid socks and free of falls. Pt denies S/I, H/I, A/H and V/H. Pt contracts for safety on the unit.

## 2017-04-30 VITALS
OXYGEN SATURATION: 99 % | RESPIRATION RATE: 18 BRPM | DIASTOLIC BLOOD PRESSURE: 88 MMHG | SYSTOLIC BLOOD PRESSURE: 120 MMHG | TEMPERATURE: 98.3 F | WEIGHT: 270 LBS | HEIGHT: 69 IN | BODY MASS INDEX: 39.99 KG/M2 | HEART RATE: 77 BPM

## 2017-04-30 PROCEDURE — 74011250637 HC RX REV CODE- 250/637: Performed by: PSYCHIATRY & NEUROLOGY

## 2017-04-30 PROCEDURE — 65220000005 HC RM SEMIPRIVATE PSYCH 3 OR 4 BED

## 2017-04-30 RX ADMIN — ZOLPIDEM TARTRATE 5 MG: 5 TABLET ORAL at 20:36

## 2017-04-30 RX ADMIN — BUPROPION HYDROCHLORIDE 150 MG: 150 TABLET, EXTENDED RELEASE ORAL at 08:09

## 2017-04-30 RX ADMIN — HALOPERIDOL 5 MG: 5 TABLET ORAL at 08:08

## 2017-04-30 RX ADMIN — METHOCARBAMOL 750 MG: 500 TABLET ORAL at 08:10

## 2017-04-30 RX ADMIN — METHOCARBAMOL 750 MG: 500 TABLET ORAL at 17:28

## 2017-04-30 RX ADMIN — BENZTROPINE MESYLATE 1 MG: 1 TABLET ORAL at 20:36

## 2017-04-30 RX ADMIN — HALOPERIDOL 5 MG: 5 TABLET ORAL at 20:36

## 2017-04-30 RX ADMIN — METHOCARBAMOL 750 MG: 500 TABLET ORAL at 20:35

## 2017-04-30 RX ADMIN — METHOCARBAMOL 750 MG: 500 TABLET ORAL at 12:35

## 2017-04-30 NOTE — BH NOTES
GROUP THERAPY PROGRESS NOTE    Marlena Reyna is not participating in [place].      Group time: 30 minutes    Personal goal for participation: none    Goal orientation: community    Group therapy participation: refused    Therapeutic interventions reviewed and discussed: goals and procedures    Impression of participation: encouraged

## 2017-04-30 NOTE — PROGRESS NOTES
Behavioral Health Progress Note      2017    Maxine Ybarra    Current Diagnosis: Schizophrenia, Paranoid and Anxiety NOS    Report from the nursing staff changes in the medical condition while patient has been on the unit:    Patient Vitals for the past 24 hrs:   Temp Pulse Resp BP   17 0750 97.9 °F (36.6 °C) 67 18 117/81   17 2009 98.5 °F (36.9 °C) 71 18 109/80       No results found for this or any previous visit (from the past 24 hour(s)). Sleep:shows no evidence of impairment    Interval Hx: Patient continues to report that he looks forward to be discharged tomorrow. Patient is denying all medically and psychiatrically symptoms. He denies A/V/H and H/S/I/P. Patient has been calm, pleasant, and has been compliant w/ meds and tolerating them well. Per staff patient has been attending all psychotherapy group sessions and participating in unit activities. Patient continues to endorse the he would like to move back to [place] and already had a [employer] which was purchased by his friend for him. He reports that tomorrow he needs a bus ticket from the hospital to the bus station. NO neurovegetative si/x reported.       Mental Status Exam: Affect/Mood are bright. Insight/Judgment are improving.       Treatment Plan:  - Lipid profile: WNL: Patient denies hx.  - HgAIC: WNL: Patient denies hx      -Psychotropic medications:  -1. Continue: Haldol 5 mg PO bid  Recommend Haldol Decanoate upon discharge for compliant but the patient refused. -2. Continue: Cogentin 1 mg PO qhs  -3. Continue: Wellbutrin  mg PO daily. -4. Continue: Ambien 10 mg PO qHS  -5. Continue: Robaxin 750 mg PO QID      Anticipated Discharge: Tentatively Tomorrow mornin2017. SW will call to confirm that all the informations gave by the patient are accurate before discharge.      Leoma Essex, MD  2017

## 2017-04-30 NOTE — BH NOTES
GROUP THERAPY PROGRESS NOTE    Rakesh Mathis is participating in Medication & Discharge education group. Group time: 30 minutes    Personal goal for participation: Understanding discharge & Medication compliance. Goal orientation: community    Group therapy participation: active    Therapeutic interventions reviewed and discussed: Understanding the discharge process and the importance of Medication compliance.

## 2017-04-30 NOTE — BH NOTES
Patient has been cooperative and pleasant throughout the shift with medication and unit activities compliance. Patient has been social and interactive within milieu. Patient stated he is positive regarding his discharge goals. Patient has reported he is eating well and is feeling much better. Patient denies suicidal ideation with no safety issues noted, will continue to monitor for safety with support as needed throughout treatment regimen.

## 2017-04-30 NOTE — BH NOTES
Patient states he is pending discharge in the AM back to [address] was contacted regarding patient receiving box lunches for travel to home. Box lunches and additional water have been placed in white bags and sealed for patient to receive in the AM. Patient has verbalized understanding and will discuss discharge with MD. Patient has been cooperative with no concerns or problems noted this shift. Will continue to monitor for safety with support.

## 2017-04-30 NOTE — PROGRESS NOTES
Problem: Suicide/Homicide (Adult/Pediatric)  Goal: *STG: Remains safe in hospital  Patient to remain safe every shift every day while hospitalized. Outcome: Progressing Towards Goal  Pt safe from self harm remains safe  Goal: *STG: Attends activities and groups  Patient to attend 2-3 group therapy every day while hospitalized. Outcome: Progressing Towards Goal  Pt does not attend group and activities with staff encouragement    Problem: Depressed Mood (Adult/Pediatric)  Goal: *STG: Complies with medication therapy  Patient to take prescribed medications as scheduled every shift every day while hospitalized. Outcome: Progressing Towards Goal  Pt compliant with medications    Comments:   Pt cooperative and pleasant, smiles engages in conversation with peers and staff. He appears excited and happy as he talk about his ex girlfriend's dad, states paid for his [employer] bus ticket coming here and again when he goes back to [place]. His girlfriend's dad likes him since he only calls during emergency when he needs something and not for everything single thing. States he likes to go back to [place] and work at [employer] because he has friends there and has only 1 friend here in [place]. Pt receptive to support and encouragement provided by staff. Compliant with medication, does not attend groups and activities with staff encouragement. Denies thoughts of self harm or harm to others, denies hallucinations. Staff continue to monitor for health and safety and provide support with therapeutic treatment.

## 2017-05-01 PROCEDURE — 74011250637 HC RX REV CODE- 250/637: Performed by: PSYCHIATRY & NEUROLOGY

## 2017-05-01 RX ORDER — METHOCARBAMOL 750 MG/1
750 TABLET, FILM COATED ORAL 4 TIMES DAILY
Qty: 120 TAB | Refills: 0 | Status: SHIPPED | OUTPATIENT
Start: 2017-05-01 | End: 2017-05-31

## 2017-05-01 RX ORDER — HALOPERIDOL 5 MG/1
5 TABLET ORAL 2 TIMES DAILY
Qty: 60 TAB | Refills: 0 | Status: SHIPPED | OUTPATIENT
Start: 2017-05-01 | End: 2017-05-31

## 2017-05-01 RX ORDER — BENZTROPINE MESYLATE 1 MG/1
1 TABLET ORAL
Qty: 30 TAB | Refills: 0 | Status: SHIPPED | OUTPATIENT
Start: 2017-05-01 | End: 2017-05-31

## 2017-05-01 RX ORDER — BUPROPION HYDROCHLORIDE 150 MG/1
150 TABLET, EXTENDED RELEASE ORAL DAILY
Qty: 30 TAB | Refills: 0 | Status: SHIPPED | OUTPATIENT
Start: 2017-05-01 | End: 2017-05-31

## 2017-05-01 RX ORDER — ZOLPIDEM TARTRATE 5 MG/1
5 TABLET ORAL
Qty: 30 TAB | Refills: 0 | Status: SHIPPED | OUTPATIENT
Start: 2017-05-01 | End: 2017-05-31

## 2017-05-01 RX ADMIN — METHOCARBAMOL 750 MG: 500 TABLET ORAL at 12:11

## 2017-05-01 RX ADMIN — BUPROPION HYDROCHLORIDE 150 MG: 150 TABLET, EXTENDED RELEASE ORAL at 08:16

## 2017-05-01 RX ADMIN — HALOPERIDOL 5 MG: 5 TABLET ORAL at 08:16

## 2017-05-01 RX ADMIN — METHOCARBAMOL 750 MG: 500 TABLET ORAL at 08:16

## 2017-05-01 NOTE — BH NOTES
Discharge instructions reviewed and given to pt along with all his belongings. He is traveling by bus to [place] where he has lived before. Pt is aware that he is to make his own follow up at [facility] in [place]. The location and phone numbers were provided. Pt is being transported by staff in the hospital [facility] to the bus station in [place]. He was provided with 2 weeks of medications except for Robaxin because the pharmacy did not have that. Pt is aware that he needs to get that filled when he arrives. He was also given 3 meals and multiple fairchild to take with him. His bus ticket is at the [place] bus station and the bus leaves at 2 PM. Pt discharged.

## 2017-05-01 NOTE — DISCHARGE INSTRUCTIONS
BEHAVIORAL HEALTH NURSING DISCHARGE NOTE      The following personal items collected during your admission are returned to you:   Dental Appliance: Dental Appliances: None  Vision: Visual Aid: None  Hearing Aid:    Jewelry: Jewelry: None  Clothing: Clothing: Belt, Footwear, Shirt, Socks, Undergarments, Pants  Other Valuables: Other Valuables: Cell Phone, R[address] sent to safe:        PATIENT INSTRUCTIONS:    Pt is moving back to [facility]. He is traveling by bus today and his ticket secured    Follow-up with [id number] [facility] [address] phone [phone]    The discharge information has been reviewed with the patient. The patient verbalized understanding. [employer] Activation    Thank you for requesting access to [employer]. Please follow the instructions below to securely access and download your online medical record. [employer] allows you to send messages to your doctor, view your test results, renew your prescriptions, schedule appointments, and more. How Do I Sign Up? 1. In your internet browser, go to [email or web address]  2. Click on the First Time User? Click Here link in the Sign In box. You will be redirect to the New Member Sign Up page. 3. Enter your [employer] Access Code exactly as it appears below. You will not need to use this code after youve completed the sign-up process. If you do not sign up before the expiration date, you must request a new code. [employer] Access Code: Activation code not generated  Current Cell>Point Status: Patient Declined (This is the date your Cell[employer] access code will )    4. Enter the last four digits of your Social Security Number (xxxx) and Date of Birth (mm/dd/yyyy) as indicated and click Submit. You will be taken to the next sign-up page. 5. Create a Cell>Point ID. This will be your [employer] login ID and cannot be changed, so think of one that is secure and easy to remember. 6. Create a [employer] password. You can change your password at any time. 7. Enter your Password Reset Question and Answer. This can be used at a later time if you forget your password. 8. Enter your e-mail address. You will receive e-mail notification when new information is available in [address] 9. Click Sign Up. You can now view and download portions of your medical record. 10. Click the Download Summary menu link to download a portable copy of your medical information. Additional Information    If you have questions, please visit the Frequently Asked Questions section of the [employer] website at [email or web address] Remember, [employer] is NOT to be used for urgent needs. For medical emergencies, dial 911.       Patient armband removed and shredded
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
## 2017-11-11 ENCOUNTER — HOSPITAL ENCOUNTER (EMERGENCY)
Age: 37
Discharge: HOME | End: 2017-11-11
Attending: EMERGENCY MEDICINE
Payer: MEDICARE

## 2017-11-11 VITALS
OXYGEN SATURATION: 99 % | RESPIRATION RATE: 16 BRPM | DIASTOLIC BLOOD PRESSURE: 82 MMHG | TEMPERATURE: 98 F | SYSTOLIC BLOOD PRESSURE: 114 MMHG | HEART RATE: 61 BPM

## 2017-11-11 DIAGNOSIS — S39.012A LUMBAR STRAIN, INITIAL ENCOUNTER: ICD-10-CM

## 2017-11-11 DIAGNOSIS — S16.1XXA STRAIN OF NECK MUSCLE, INITIAL ENCOUNTER: Primary | ICD-10-CM

## 2017-11-11 DIAGNOSIS — R20.2 PARESTHESIA: ICD-10-CM

## 2017-11-11 PROCEDURE — 99282 EMERGENCY DEPT VISIT SF MDM: CPT

## 2017-11-11 RX ORDER — CYCLOBENZAPRINE HCL 10 MG
10 TABLET ORAL
Qty: 15 TAB | Refills: 0 | Status: SHIPPED | OUTPATIENT
Start: 2017-11-11 | End: 2017-11-16

## 2017-11-11 NOTE — DISCHARGE INSTRUCTIONS
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
## 2017-11-11 NOTE — ED NOTES
I have reviewed discharge instructions with the patient. The patient verbalized understanding. Patient discharged via wheelchair with friend. One prescription given to patient. Patient armband removed and shredded.

## 2017-11-11 NOTE — ED TRIAGE NOTES
Pt states \"I woke up at the shelter this morning and I couldn't barely move because of my pinched nerve\"  Pt states that he was seen at [facility] today and also another time this week.   Pt states the steroids they gave me about an hour ago arent working

## 2020-02-11 ENCOUNTER — HOSPITAL ENCOUNTER (EMERGENCY)
Dept: HOSPITAL 75 - ER | Age: 40
Discharge: HOME | End: 2020-02-11
Payer: COMMERCIAL

## 2020-02-11 VITALS — HEIGHT: 68.9 IN | BODY MASS INDEX: 46.65 KG/M2 | WEIGHT: 315 LBS

## 2020-02-11 VITALS — SYSTOLIC BLOOD PRESSURE: 127 MMHG | DIASTOLIC BLOOD PRESSURE: 95 MMHG

## 2020-02-11 DIAGNOSIS — Z59.0: ICD-10-CM

## 2020-02-11 DIAGNOSIS — R42: Primary | ICD-10-CM

## 2020-02-11 PROCEDURE — 99283 EMERGENCY DEPT VISIT LOW MDM: CPT

## 2020-02-11 SDOH — ECONOMIC STABILITY - HOUSING INSECURITY: HOMELESSNESS: Z59.0

## 2020-02-11 NOTE — ED GENERAL
General


Chief Complaint:  General Problems/Pain


Stated Complaint:  DIZZY


Source of Information:  Patient, EMS


Exam Limitations:  No Limitations





History of Present Illness


Date Seen by Provider:  Feb 11, 2020


Time Seen by Provider:  21:49


Initial Comments


ER from the streets of [place] where it's about 15 wearing a T-shirt and 

sweat pants with reports that he is homeless and cold. He has no other 

complaints aside from dizziness which she states is "not bad" and "probably just

from being cold". He has no other medical complaints, states he just needs a 

warm place to stay tonight. He is from [place], then moved to [place], has 

been [place] since Thursday.





Allergies and Home Medications


Allergies


Coded Allergies:  


     No Known Drug Allergies (Unverified , 2/11/20)





Home Medications


No Active Prescriptions or Reported Meds





Patient Home Medication List


Home Medication List Reviewed:  Yes





Review of Systems


Review of Systems


Constitutional:  see HPI


EENTM:  see HPI


Respiratory:  no symptoms reported


Cardiovascular:  no symptoms reported


Musculoskeletal:  no symptoms reported


Psychiatric/Neurological:  See HPI


Hematologic/Lymphatic:  No Symptoms Reported





Past Medical-Social-Family Hx


Patient Social History


Alcohol Use:  Denies Use


Recreational Drug Use:  No


Smoking Status:  Never a Smoker


2nd Hand Smoke Exposure:  No


Recent Foreign Travel:  No


Contact w/Someone Who Travel:  No


Recent Hopitalizations:  No


Physical Abuse:  No


Sexual Abuse:  No


Mistreated:  No


Fear:  No





Immunizations Up To Date


Tetanus Booster (TDap):  Unknown





Seasonal Allergies


Seasonal Allergies:  No





Past Medical History


Surgeries:  Yes (right knee, fatty tumor)


Orthopedic


Respiratory:  No


Cardiac:  No


Neurological:  No


Genitourinary:  No


Gastrointestinal:  No


Musculoskeletal:  No


Endocrine:  No


HEENT:  No


Cancer:  No


Psychosocial:  No


Integumentary:  No


Blood Disorders:  No





Physical Exam


Vital Signs


Capillary Refill :


Height, Weight, BMI


Height: '"


Weight: lbs. oz. kg;  BMI


Method:


General Appearance:  No Apparent Distress, WD/WN, Obese (has been very pleasant 

with staff, very polite. Offered him a meal tray and that he could sleep in the 

waiting room from the cold temperatures outside he states "oh that would be 

wonderful".)


Eyes:  Bilateral Eye Normal Inspection, Bilateral Eye PERRL, Bilateral Eye EOMI


HEENT:  PERRL/EOMI, TMs Normal


Neck:  Full Range of Motion, Normal Inspection


Respiratory:  No Accessory Muscle Use, No Respiratory Distress


Gastrointestinal:  Non Tender, Soft


Extremity:  Normal Capillary Refill, Normal Inspection


Neurologic/Psychiatric:  Alert, Oriented x3





Progress/Results/Core Measures


Suspected Sepsis


SIRS


Temperature: 


Pulse:  


Respiratory Rate: 


 


Blood Pressure  / 


Mean:





Results/Orders


Vital Signs/I&O


Capillary Refill :





Departure


Impression





   Primary Impression:  


   Homelessness


Disposition:  01 HOME, SELF-CARE


Condition:  Stable





Departure-Patient Inst.


Decision time for Depature:  21:50


Referrals:  


NO,LOCAL PHYSICIAN (PCP)


Primary Care Physician


Patient Instructions:  NO INSTRUCTIONS GIVEN


Scripts


No Active Prescriptions or Reported Meds











JONAH CASTILLO             Feb 11, 2020 21:51

## 2020-02-14 ENCOUNTER — HOSPITAL ENCOUNTER (EMERGENCY)
Dept: HOSPITAL 75 - ER | Age: 40
Discharge: ANOTHER HOSPITAL | End: 2020-02-14
Payer: COMMERCIAL

## 2020-02-14 VITALS — DIASTOLIC BLOOD PRESSURE: 80 MMHG | SYSTOLIC BLOOD PRESSURE: 125 MMHG

## 2020-02-14 VITALS — BODY MASS INDEX: 46.2 KG/M2 | WEIGHT: 311.95 LBS | HEIGHT: 68.9 IN

## 2020-02-14 DIAGNOSIS — F23: Primary | ICD-10-CM

## 2020-02-14 DIAGNOSIS — Z86.59: ICD-10-CM

## 2020-02-14 DIAGNOSIS — R45.851: ICD-10-CM

## 2020-02-14 LAB
ALBUMIN SERPL-MCNC: 3.9 GM/DL (ref 3.2–4.5)
ALP SERPL-CCNC: 58 U/L (ref 40–136)
ALT SERPL-CCNC: 30 U/L (ref 0–55)
APAP SERPL-MCNC: < 10 UG/ML (ref 10–30)
APTT PPP: YELLOW S
BACTERIA #/AREA URNS HPF: (no result) /HPF
BARBITURATES UR QL: NEGATIVE
BASOPHILS # BLD AUTO: 0.1 10^3/UL (ref 0–0.1)
BASOPHILS NFR BLD AUTO: 1 % (ref 0–10)
BENZODIAZ UR QL SCN: NEGATIVE
BILIRUB SERPL-MCNC: 0.5 MG/DL (ref 0.1–1)
BILIRUB UR QL STRIP: NEGATIVE
BUN/CREAT SERPL: 7
CALCIUM SERPL-MCNC: 9.5 MG/DL (ref 8.5–10.1)
CHLORIDE SERPL-SCNC: 104 MMOL/L (ref 98–107)
CO2 SERPL-SCNC: 25 MMOL/L (ref 21–32)
COCAINE UR QL: NEGATIVE
CREAT SERPL-MCNC: 1.4 MG/DL (ref 0.6–1.3)
EOSINOPHIL # BLD AUTO: 0.3 10^3/UL (ref 0–0.3)
EOSINOPHIL NFR BLD AUTO: 4 % (ref 0–10)
ERYTHROCYTE [DISTWIDTH] IN BLOOD BY AUTOMATED COUNT: 14.6 % (ref 10–14.5)
FIBRINOGEN PPP-MCNC: CLEAR MG/DL
GFR SERPLBLD BASED ON 1.73 SQ M-ARVRAT: > 60 ML/MIN
GLUCOSE SERPL-MCNC: 100 MG/DL (ref 70–105)
GLUCOSE UR STRIP-MCNC: NEGATIVE MG/DL
HCT VFR BLD CALC: 48 % (ref 40–54)
HGB BLD-MCNC: 16.1 G/DL (ref 13.3–17.7)
KETONES UR QL STRIP: NEGATIVE
LEUKOCYTE ESTERASE UR QL STRIP: NEGATIVE
LYMPHOCYTES # BLD AUTO: 3.6 X 10^3 (ref 1–4)
LYMPHOCYTES NFR BLD AUTO: 38 % (ref 12–44)
MANUAL DIFFERENTIAL PERFORMED BLD QL: NO
MCH RBC QN AUTO: 29 PG (ref 25–34)
MCHC RBC AUTO-ENTMCNC: 34 G/DL (ref 32–36)
MCV RBC AUTO: 85 FL (ref 80–99)
METHADONE UR QL SCN: NEGATIVE
METHAMPHETAMINE SCREEN URINE S: NEGATIVE
MONOCYTES # BLD AUTO: 1 X 10^3 (ref 0–1)
MONOCYTES NFR BLD AUTO: 11 % (ref 0–12)
NEUTROPHILS # BLD AUTO: 4.5 X 10^3 (ref 1.8–7.8)
NEUTROPHILS NFR BLD AUTO: 47 % (ref 42–75)
NITRITE UR QL STRIP: NEGATIVE
OPIATES UR QL SCN: NEGATIVE
OXYCODONE UR QL: NEGATIVE
PH UR STRIP: 6.5 [PH] (ref 5–9)
PLATELET # BLD: 245 10^3/UL (ref 130–400)
PMV BLD AUTO: 11.3 FL (ref 7.4–10.4)
POTASSIUM SERPL-SCNC: 4.2 MMOL/L (ref 3.6–5)
PROPOXYPH UR QL: NEGATIVE
PROT SERPL-MCNC: 7.2 GM/DL (ref 6.4–8.2)
PROT UR QL STRIP: NEGATIVE
RBC #/AREA URNS HPF: (no result) /HPF
SALICYLATES SERPL-MCNC: < 5 MG/DL (ref 5–20)
SODIUM SERPL-SCNC: 138 MMOL/L (ref 135–145)
SP GR UR STRIP: 1.01 (ref 1.02–1.02)
SQUAMOUS #/AREA URNS HPF: (no result) /HPF
TRICYCLICS UR QL SCN: NEGATIVE
WBC # BLD AUTO: 9.5 10^3/UL (ref 4.3–11)
WBC #/AREA URNS HPF: (no result) /HPF

## 2020-02-14 PROCEDURE — 81000 URINALYSIS NONAUTO W/SCOPE: CPT

## 2020-02-14 PROCEDURE — 85025 COMPLETE CBC W/AUTO DIFF WBC: CPT

## 2020-02-14 PROCEDURE — 93005 ELECTROCARDIOGRAM TRACING: CPT

## 2020-02-14 PROCEDURE — 80329 ANALGESICS NON-OPIOID 1 OR 2: CPT

## 2020-02-14 PROCEDURE — 80053 COMPREHEN METABOLIC PANEL: CPT

## 2020-02-14 PROCEDURE — 36415 COLL VENOUS BLD VENIPUNCTURE: CPT

## 2020-02-14 PROCEDURE — 80320 DRUG SCREEN QUANTALCOHOLS: CPT

## 2020-02-14 PROCEDURE — 80306 DRUG TEST PRSMV INSTRMNT: CPT

## 2020-02-14 PROCEDURE — 84443 ASSAY THYROID STIM HORMONE: CPT

## 2020-02-14 NOTE — ED PSYCHOSOCIAL
General


Chief Complaint:  Psych/Social Disorder


Stated Complaint:  PSYCH EVAL


Nursing Triage Note:  


Pt ambulates to [facility] with c/o "suicidal thoughtss" and "hearing voices". Pt 


states he has a Hx of schizophrenia and does not take meds, "hasn't taken meds 


in years". Pt appears to be in a pleasant mood on arrival, has earphones in his 


ears and was on phone during triage questions.


Source:  patient


Exam Limitations:  no limitations





History of Present Illness


Date Seen by Provider:  Feb 14, 2020


Time Seen by Provider:  01:33


Initial Comments


31-year-old gentleman arrives with reports of suicidal thoughts and hearing 

voices. Patient is homeless and does have schizophrenia. He is currently out of 

his meds and not sure what those were. States that he is having increasing 

thoughts over the last several days and voices are telling him to kill himself. 

His plan is to walk into traffic. Denies homicidal ideation. States he has long 

history of schizophrenia as well as bipolar and anxiety/depression. He went off 

his meds to determine driving school but that is taking longer than he had 

anticipated. He is from the [facility] but eventually is trying to get 

back to [place] to his mother's.


Timing/Duration:  week, getting worse


Severity:  moderate


Associated Symptoms:  impaired concentration, suicidal ideation





Allergies and Home Medications


Allergies


Coded Allergies:  


     No Known Drug Allergies (Unverified , 2/11/20)





Home Medications


No Active Prescriptions or Reported Meds





Patient Home Medication List


Home Medication List Reviewed:  Yes





Review of Systems


Constitutional:  see HPI; No chills, No fever


EENTM:  no symptoms reported


Respiratory:  no symptoms reported


Cardiovascular:  no symptoms reported


Gastrointestinal:  no symptoms reported


Genitourinary:  no symptoms reported


Musculoskeletal:  no symptoms reported


Skin:  no symptoms reported


Psychiatric/Neurological:  Anxiety, Depressed, Emotional Problems





All Other Systems Reviewed


Negative Unless Noted:  Yes





Past Medical-Social-Family Hx


Past Med/Social Hx:  Reviewed Nursing Past Med/Soc Hx


Patient Social History


Alcohol Use:  Denies Use


Recreational Drug Use:  No


Smoking Status:  Never a Smoker


2nd Hand Smoke Exposure:  No


Recent Foreign Travel:  No


Contact w/Someone Who Travel:  No


Recent Infectious Disease Expo:  No


Recent Hopitalizations:  No





Immunizations Up To Date


Tetanus Booster (TDap):  Unknown





Seasonal Allergies


Seasonal Allergies:  No





Past Medical History


Surgeries:  Yes (right knee, fatty tumor)


Orthopedic


Respiratory:  No


Cardiac:  No


Neurological:  No


Genitourinary:  No


Gastrointestinal:  No


Musculoskeletal:  No


Endocrine:  No


HEENT:  No


Cancer:  No


Psychosocial:  No


Integumentary:  No


Blood Disorders:  No





Family Medical History


Reviewed Nursing Family Hx


No Pertinent Family Hx





Physical Exam





Vital Signs - First Documented








 2/14/20





 01:20


 


Temp 37.0


 


Pulse 68


 


Resp 20


 


B/P (MAP) 131/82 (98)


 


Pulse Ox 100


 


O2 Delivery Room Air





Capillary Refill : Less Than 3 Seconds


Height, Weight, BMI


Height: '"


Weight: lbs. oz. kg; 46.00 BMI


Method:


General Appearance:  WD/WN, no apparent distress


HEENT:  PERRL/EOMI, pharynx normal


Neck:  full range of motion, supple


Respiratory:  lungs clear, normal breath sounds


Cardiovascular:  regular rate, rhythm, no murmur


Peripheral Pulses:  2+ Dorsalis Pedis (R), 2+ Left Dors-Pedis (L), 2+ Radial Pu

lses (R), 2+ Radial Pulses (L)


Gastrointestinal:  non tender, soft, no organomegaly


Extremities:  non-tender, normal inspection


Neurologic/Psychiatric:  alert, oriented x 3


Appearance/Memory:  appropriate appearance, appropriate insight


Behavior/Eye Contact:  cooperative, good eye contact, normal speech


Thoughts/Hallucinations:  auditory hallucinations


Skin:  normal color, warm/dry





Progress/Results/Core Measures


Results/Orders


Lab Results





Laboratory Tests








Test


 2/14/20


01:31 2/14/20


02:30 Range/Units


 


 


White Blood Count


 9.5 


 


 4.3-11.0


10^3/uL


 


Red Blood Count


 5.60 


 


 4.35-5.85


10^6/uL


 


Hemoglobin 16.1   13.3-17.7  G/DL


 


Hematocrit 48   40-54  %


 


Mean Corpuscular Volume 85   80-99  FL


 


Mean Corpuscular Hemoglobin 29   25-34  PG


 


Mean Corpuscular Hemoglobin


Concent 34 


 


 32-36  G/DL





 


Red Cell Distribution Width 14.6 H  10.0-14.5  %


 


Platelet Count


 245 


 


 130-400


10^3/uL


 


Mean Platelet Volume 11.3 H  7.4-10.4  FL


 


Neutrophils (%) (Auto) 47   42-75  %


 


Lymphocytes (%) (Auto) 38   12-44  %


 


Monocytes (%) (Auto) 11   0-12  %


 


Eosinophils (%) (Auto) 4   0-10  %


 


Basophils (%) (Auto) 1   0-10  %


 


Neutrophils # (Auto) 4.5   1.8-7.8  X 10^3


 


Lymphocytes # (Auto) 3.6   1.0-4.0  X 10^3


 


Monocytes # (Auto) 1.0   0.0-1.0  X 10^3


 


Eosinophils # (Auto)


 0.3 


 


 0.0-0.3


10^3/uL


 


Basophils # (Auto)


 0.1 


 


 0.0-0.1


10^3/uL


 


Sodium Level 138   135-145  MMOL/L


 


Potassium Level 4.2   3.6-5.0  MMOL/L


 


Chloride Level 104     MMOL/L


 


Carbon Dioxide Level 25   21-32  MMOL/L


 


Anion Gap 9   5-14  MMOL/L


 


Blood Urea Nitrogen 10   7-18  MG/DL


 


Creatinine


 1.40 H


 


 0.60-1.30


MG/DL


 


Estimat Glomerular Filtration


Rate > 60 


 


  





 


BUN/Creatinine Ratio 7    


 


Glucose Level 100     MG/DL


 


Calcium Level 9.5   8.5-10.1  MG/DL


 


Corrected Calcium 9.6   8.5-10.1  MG/DL


 


Total Bilirubin 0.5   0.1-1.0  MG/DL


 


Aspartate Amino Transf


(AST/SGOT) 25 


 


 5-34  U/L





 


Alanine Aminotransferase


(ALT/SGPT) 30 


 


 0-55  U/L





 


Alkaline Phosphatase 58     U/L


 


Total Protein 7.2   6.4-8.2  GM/DL


 


Albumin 3.9   3.2-4.5  GM/DL


 


Thyroid Stimulating Hormone


(TSH) 1.73 


 


 0.35-4.94


UIU/ML


 


Salicylates Level < 5.0 L  5.0-20.0  MG/DL


 


Acetaminophen Level < 10 L  10-30  UG/ML


 


Serum Alcohol < 10   <10  MG/DL


 


Urine Color  YELLOW   


 


Urine Clarity  CLEAR   


 


Urine pH  6.5  5-9  


 


Urine Specific Gravity  1.010 L 1.016-1.022  


 


Urine Protein  NEGATIVE  NEGATIVE  


 


Urine Glucose (UA)  NEGATIVE  NEGATIVE  


 


Urine Ketones  NEGATIVE  NEGATIVE  


 


Urine Nitrite  NEGATIVE  NEGATIVE  


 


Urine Bilirubin  NEGATIVE  NEGATIVE  


 


Urine Urobilinogen  0.2  < = 1.0  MG/DL


 


Urine Leukocyte Esterase  NEGATIVE  NEGATIVE  


 


Urine RBC (Auto)  NEGATIVE  NEGATIVE  


 


Urine RBC  NONE   /HPF


 


Urine WBC  NONE   /HPF


 


Urine Squamous Epithelial


Cells 


 0-2 


  /HPF





 


Urine Crystals  NONE   /LPF


 


Urine Bacteria  TRACE   /HPF


 


Urine Casts  NONE   /LPF


 


Urine Mucus  NEGATIVE   /LPF


 


Urine Culture Indicated  NO   


 


Urine Opiates Screen  NEGATIVE  NEGATIVE  


 


Urine Oxycodone Screen  NEGATIVE  NEGATIVE  


 


Urine Methadone Screen  NEGATIVE  NEGATIVE  


 


Urine Propoxyphene Screen  NEGATIVE  NEGATIVE  


 


Urine Barbiturates Screen  NEGATIVE  NEGATIVE  


 


Ur Tricyclic Antidepressants


Screen 


 NEGATIVE 


 NEGATIVE  





 


Urine Phencyclidine Screen  NEGATIVE  NEGATIVE  


 


Urine Amphetamines Screen  NEGATIVE  NEGATIVE  


 


Urine Methamphetamines Screen  NEGATIVE  NEGATIVE  


 


Urine Benzodiazepines Screen  NEGATIVE  NEGATIVE  


 


Urine Cocaine Screen  NEGATIVE  NEGATIVE  


 


Urine Cannabinoids Screen  NEGATIVE  NEGATIVE  








My Orders





Orders - DEVANTE SINGH MD


Ua Culture If Indicated (2/14/20 01:32)


Cbc With Automated Diff (2/14/20 01:32)


Comprehensive Metabolic Panel (2/14/20 01:32)


Alcohol (2/14/20 01:32)


Drug Screen Stat (Urine) (2/14/20 01:32)


Acetaminophen (2/14/20 01:32)


Salicylate (2/14/20 01:32)


Ekg Tracing (2/14/20 01:32)


Ed Iv/Invasive Line Start (2/14/20 01:32)


Monitor-Rhythm Ecg Trace Only (2/14/20 01:32)


Bh Status Checks/Observation Q15M (2/14/20 01:32)


Thyroid Stimulating Hormone (2/14/20 01:31)


General/Regular (2/14/20 Breakfast)





Vital Signs/I&O











 2/14/20





 01:20


 


Temp 37.0


 


Pulse 68


 


Resp 20


 


B/P (MAP) 131/82 (98)


 


Pulse Ox 100


 


O2 Delivery Room Air














Blood Pressure Mean:                    98











Progress


Progress Note :  


Progress Note


Seen and evaluated. Medical clearance screening initiated. Monitor patient. 

0318: Labs and evaluation complete. He is medically cleared for inpatient 

psychiatric treatment. We will initiate process of finding a bed in the region. 

I did rediscuss with the patient and he is asking to go to inpatient treatment 

to get his schizophrenia under control and help with the suicidal ideations. 

Monitor patient. 0400: Patient remains pleasant and is resting peacefully. I 

have discussed the case with Dr. Cho at [facility]. We did 

discuss current status and findings and she has graciously accepted the patient 

in transfer. I do think the patient will benefit greatly from inpatient 

treatment and reestablishing medicines. Patient appreciative of care. Pending 

transfer.


Initial ECG Impression Date:  Feb 14, 2020


Initial ECG Impression Time:  01:38


Initial ECG Rate:  61


Initial ECG Rhythm:  Normal Sinus


Comment


Sinus with normal axis. No evidence of ST elevation MI. Flat T waves noted 

throughout. No previous available for comparison. Interpreted by me.





Departure


Impression





   Primary Impression:  


   Acute schizophrenia


   Additional Impression:  


   Suicidal ideations


Disposition:  02 XFER SHT-TRM HOSP


Condition:  Stable





Transfer


Transfer Reason:  Exceeds level of care


Time Spoke to Accepting Phy:  04:00


Transfer Facility:  


[place], [place], Dr. Cho accepting


Method of Transfer:  Private Vehicle (secure transport)





Departure-Patient Inst.


Referrals:  


NO,LOCAL PHYSICIAN (PCP/Family)


Primary Care Physician


Scripts


No Active Prescriptions or Reported Meds











DEVANTE SINGH MD          Feb 14, 2020 02:04

## 2020-02-21 NOTE — XMS REPORT
Continuity of Care Document

                             Created on: 2020



ALMA ZAIDI

External Reference #: [id number]

: 1980

Sex: Male



Demographics





                          Address                   [address]  [address]

 

                          Home Phone                [phone] x

 

                          Preferred Language        Unknown

 

                          Marital Status            Unknown

 

                          Scientology Affiliation     Unknown

 

                          Race                      Unknown

 

                          Ethnic Group              Unknown





Author





                          Organization              Unknown

 

                          Address                   Unknown

 

                          Phone                     Unavailable



              



Allergies

      



             Active           Description           Code           Type         

  Severity   

                Reaction           Onset           Reported/Identified          

 

Relationship to Patient                 Clinical Status        

 

                Yes             No Known Drug Allergies           [id number]    

       Drug 

Allergy           Unknown           N/A                             2020  

      

                                                             



                  



Medications

      



There is no data.                  



Problems

      



There is no data.                  



Procedures

      



There is no data.                  



Results

      



                    Test                Result              Range        

 

                                        Complete blood count (CBC) with automate

d white blood cell (WBC) differential - 

20 01:31         

 

                          Blood leukocytes automated count (number/volume)      

     9.5 10*3/uL          

                                        4.3-11.0        

 

                          Blood erythrocytes automated count (number/volume)    

       5.60 10*6/uL       

                                        4.35-5.85        

 

                    Venous blood hemoglobin measurement (mass/volume)           

16.1 g/dL           

13.3-17.7        

 

                    Blood hematocrit (volume fraction)           48 %           

     40-54        

 

                    Automated erythrocyte mean corpuscular volume           85 [

foz_us]           

80-99        

 

                                        Automated erythrocyte mean corpuscular h

emoglobin (mass per erythrocyte)        

                          29 pg                     25-34        

 

                                        Automated erythrocyte mean corpuscular h

emoglobin concentration measurement 

(mass/volume)             34 g/dL                   32-36        

 

                    Automated erythrocyte distribution width ratio           14.

6 %              10.0-

14.5        

 

                    Automated blood platelet count (count/volume)           245 

10*3/uL           

130-400        

 

                          Automated blood platelet mean volume measurement      

     11.3 [foz_us]        

                                        7.4-10.4        

 

                    Automated blood neutrophils/100 leukocytes           47 %   

             42-75       

 

 

                    Automated blood lymphocytes/100 leukocytes           38 %   

             12-44       

 

 

                    Blood monocytes/100 leukocytes           11 %               

 0-12        

 

                    Automated blood eosinophils/100 leukocytes           4 %    

             0-10        

 

                    Automated blood basophils/100 leukocytes           1 %      

           0-10        

 

                    Blood neutrophils automated count (number/volume)           

4.5 10*3            

1.8-7.8        

 

                    Blood lymphocytes automated count (number/volume)           

3.6 10*3            

1.0-4.0        

 

                    Blood monocytes automated count (number/volume)           1.

0 10*3            

0.0-1.0        

 

                    Automated eosinophil count           0.3 10*3/uL           0

.0-0.3        

 

                    Automated blood basophil count (count/volume)           0.1 

10*3/uL           

0.0-0.1        

 

                                        Comprehensive metabolic panel - 20

 01:31         

 

                          Serum or plasma sodium measurement (moles/volume)     

      138 mmol/L          

                                        135-145        

 

                          Serum or plasma potassium measurement (moles/volume)  

         4.2 mmol/L       

                                        3.6-5.0        

 

                          Serum or plasma chloride measurement (moles/volume)   

        104 mmol/L        

                                                

 

                    Carbon dioxide           25 mmol/L           21-32        

 

                          Serum or plasma anion gap determination (moles/volume)

           9 mmol/L       

                                        5-14        

 

                          Serum or plasma urea nitrogen measurement (mass/volume

)           10 mg/dL      

                                        7-18        

 

                          Serum or plasma creatinine measurement (mass/volume)  

         1.40 mg/dL       

                                        0.60-1.30        

 

                    Serum or plasma urea nitrogen/creatinine mass ratio         

  7                   NRG  

      

 

                                        Serum or plasma creatinine measurement w

ith calculation of estimated glomerular 

filtration rate           >                         NRG        

 

                    Serum or plasma glucose measurement (mass/volume)           

100 mg/dL           

        

 

                    Serum or plasma calcium measurement (mass/volume)           

9.5 mg/dL           

8.5-10.1        

 

                          Serum or plasma total bilirubin measurement (mass/volu

me)           0.5 mg/dL   

                                        0.1-1.0        

 

                                        Serum or plasma alkaline phosphatase leia

surement (enzymatic activity/volume)    

                          58 U/L                            

 

                                        Serum or plasma aspartate aminotransfera

se measurement (enzymatic 

activity/volume)           25 U/L                    5-34        

 

                                        Serum or plasma alanine aminotransferase

 measurement (enzymatic activity/volume)

                          30 U/L                    0-55        

 

                    Serum or plasma protein measurement (mass/volume)           

7.2 g/dL            

6.4-8.2        

 

                    Serum or plasma albumin measurement (mass/volume)           

3.9 g/dL            

3.2-4.5        

 

                    CALCIUM CORRECTED           9.6 mg/dL           8.5-10.1    

    

 

                                        THYROID STIMULATING HORMONE - 20 0

1:31         

 

                    THYROID STIMULATING HORMONE           1.73 u[iU]/mL         

  0.35-4.94        

 

                                        Serum or plasma salicylates measurement 

(mass/volume) - 20 01:31         

 

                          Serum or plasma salicylates measurement (mass/volume) 

          < mg/dL         

                                        5.0-20.0        

 

                                        Serum or plasma acetaminophen measuremen

t (mass/volume) - 20 01:31        

 

 

                          Serum or plasma acetaminophen measurement (mass/volume

)           < ug/mL       

                                        10-30        

 

                                        Serum or plasma ethanol measurement (mas

s/volume) - 20 01:31         

 

                    Serum or plasma ethanol measurement (mass/volume)           

< mg/dL             

<10        

 

                                        Complete urinalysis with reflex to cultu

re - 20 02:30         

 

                    Urine color determination           YELLOW              NRG 

       

 

                    Urine clarity determination           CLEAR               NR

G        

 

                    Urine pH measurement by test strip           6.5            

     5-9        

 

                    Specific gravity of urine by test strip           1.010     

          1.016-1.022  

      

 

                          Urine protein assay by test strip, semi-quantitative  

         NEGATIVE         

                                        NEGATIVE        

 

                    Urine glucose detection by automated test strip           NE

GATIVE            

NEGATIVE        

 

                          Erythrocytes detection in urine sediment by light micr

oscopy           NEGATIVE 

                                        NEGATIVE        

 

                    Urine ketones detection by automated test strip           NE

GATIVE            

NEGATIVE        

 

                    Urine nitrite detection by test strip           NEGATIVE    

        NEGATIVE    

    

 

                    Urine total bilirubin detection by test strip           NEGA

TIVE            

NEGATIVE        

 

                          Urine urobilinogen measurement by automated test strip

 (mass/volume)           

0.2 mg/dL                               < = 1.0        

 

                    Urine leukocyte esterase detection by dipstick           NEG

ATIVE            

NEGATIVE        

 

                                        Automated urine sediment erythrocyte cou

nt by microscopy (number/high power 

field)                    NONE                      NRG        

 

                                        Automated urine sediment leukocyte count

 by microscopy (number/high power field)

                          NONE                      NRG        

 

                          Bacteria detection in urine sediment by light microsco

py           TRACE        

                                        NRG        

 

                                        Squamous epithelial cells detection in u

rine sediment by light microscopy       

                          0-2                       NRG        

 

                          Crystals detection in urine sediment by light microsco

py           NONE         

                                        NRG        

 

                    Casts detection in urine sediment by light microscopy       

    NONE                

NRG        

 

                          Mucus detection in urine sediment by light microscopy 

          NEGATIVE        

                                        NRG        

 

                    Complete urinalysis with reflex to culture           NO     

             NRG        

 

                                        Urine drug screening test - 20 02:

30         

 

                    Urine phencyclidine detection by screening method           

NEGATIVE            

NEGATIVE        

 

                          Urine benzodiazepines detection by screening method   

        NEGATIVE          

                                        NEGATIVE        

 

                    Urine cocaine detection           NEGATIVE            NEGATI

VE        

 

                    Urine amphetamines detection by screening method           N

EGATIVE            

NEGATIVE        

 

                          Urine methamphetamine detection by screening method   

        NEGATIVE          

                                        NEGATIVE        

 

                    Urine cannabinoids detection by screening method           N

EGATIVE            

NEGATIVE        

 

                    Urine opiates detection by screening method           NEGATI

VE            

NEGATIVE        

 

                    Urine barbiturates detection           NEGATIVE            N

EGATIVE        

 

                          Screening urine tricyclic antidepressants detection   

        NEGATIVE          

                                        NEGATIVE        

 

                    Urine methadone detection by screening method           NEGA

TIVE            

NEGATIVE        

 

                    Urine oxycodone detection           NEGATIVE            NEGA

TIVE        

 

                    Urine propoxyphene detection           NEGATIVE            N

EGATIVE        



                              



Encounters

      



                ACCT No.           Visit Date/Time           Discharge          

 Status         

             Pt. Type           Provider           Facility           Loc./Unit 

          

Complaint        

 

                    [id number]           2020 00:28:00           

020 06:28:00        

                [facility]             Emergency           FRANCISCO CHOI, DEVANTE Murray 

[facility]           ER                        PSYCH EVAL     

   

 

                    [id number]           2020 21:39:00           

020 22:03:00        

                [facility]             Emergency           JONAH CASTILLO         

  [facility]           ER                        DIZZY